# Patient Record
Sex: FEMALE | Race: WHITE | URBAN - METROPOLITAN AREA
[De-identification: names, ages, dates, MRNs, and addresses within clinical notes are randomized per-mention and may not be internally consistent; named-entity substitution may affect disease eponyms.]

---

## 2022-03-17 LAB
ANTIBODY SCREEN, EXTERNAL: NEGATIVE
CHLAMYDIA, EXTERNAL: NEGATIVE
HBSAG, EXTERNAL: NEGATIVE
HIV, EXTERNAL: NEGATIVE
N. GONORRHEA, EXTERNAL: NEGATIVE
RPR, EXTERNAL: NON REACTIVE
RUBELLA, EXTERNAL: NORMAL
TYPE, ABO & RH, EXTERNAL: NORMAL

## 2022-08-12 ENCOUNTER — HOSPITAL ENCOUNTER (INPATIENT)
Age: 32
LOS: 4 days | Discharge: HOME | DRG: 833 | End: 2022-08-16
Attending: OBSTETRICS & GYNECOLOGY | Admitting: OBSTETRICS & GYNECOLOGY

## 2022-08-12 PROBLEM — O44.00 PLACENTA PREVIA: Status: ACTIVE | Noted: 2022-08-12

## 2022-08-12 LAB
ABO + RH BLD: NORMAL
BLOOD GROUP ANTIBODIES SERPL: NORMAL
ERYTHROCYTE [DISTWIDTH] IN BLOOD BY AUTOMATED COUNT: 12.7 % (ref 11.5–14.5)
HCT VFR BLD AUTO: 34.5 % (ref 35–47)
HGB BLD-MCNC: 11.7 G/DL (ref 11.5–16)
MCH RBC QN AUTO: 31.5 PG (ref 26–34)
MCHC RBC AUTO-ENTMCNC: 33.9 G/DL (ref 30–36.5)
MCV RBC AUTO: 93 FL (ref 80–99)
NRBC # BLD: 0 K/UL (ref 0–0.01)
NRBC BLD-RTO: 0 PER 100 WBC
PLATELET # BLD AUTO: 324 K/UL (ref 150–400)
PMV BLD AUTO: 9.4 FL (ref 8.9–12.9)
RBC # BLD AUTO: 3.71 M/UL (ref 3.8–5.2)
SPECIMEN EXP DATE BLD: NORMAL
WBC # BLD AUTO: 9.6 K/UL (ref 3.6–11)

## 2022-08-12 PROCEDURE — 85027 COMPLETE CBC AUTOMATED: CPT

## 2022-08-12 PROCEDURE — 65410000002 HC RM PRIVATE OB

## 2022-08-12 PROCEDURE — 74011250637 HC RX REV CODE- 250/637: Performed by: OBSTETRICS & GYNECOLOGY

## 2022-08-12 PROCEDURE — 36415 COLL VENOUS BLD VENIPUNCTURE: CPT

## 2022-08-12 PROCEDURE — 86900 BLOOD TYPING SEROLOGIC ABO: CPT

## 2022-08-12 PROCEDURE — 59025 FETAL NON-STRESS TEST: CPT

## 2022-08-12 PROCEDURE — 99283 EMERGENCY DEPT VISIT LOW MDM: CPT

## 2022-08-12 PROCEDURE — 74011250636 HC RX REV CODE- 250/636: Performed by: OBSTETRICS & GYNECOLOGY

## 2022-08-12 PROCEDURE — 74011000250 HC RX REV CODE- 250: Performed by: OBSTETRICS & GYNECOLOGY

## 2022-08-12 RX ORDER — FAMOTIDINE 20 MG/1
20 TABLET, FILM COATED ORAL
Status: DISCONTINUED | OUTPATIENT
Start: 2022-08-12 | End: 2022-08-16 | Stop reason: HOSPADM

## 2022-08-12 RX ORDER — FOLIC ACID/MULTIVIT,IRON,MINER 0.4MG-18MG
1 TABLET ORAL DAILY
Status: DISCONTINUED | OUTPATIENT
Start: 2022-08-12 | End: 2022-08-16 | Stop reason: HOSPADM

## 2022-08-12 RX ORDER — ONDANSETRON 4 MG/1
4 TABLET, ORALLY DISINTEGRATING ORAL
Status: DISCONTINUED | OUTPATIENT
Start: 2022-08-12 | End: 2022-08-16 | Stop reason: HOSPADM

## 2022-08-12 RX ORDER — BETAMETHASONE SODIUM PHOSPHATE AND BETAMETHASONE ACETATE 3; 3 MG/ML; MG/ML
12 INJECTION, SUSPENSION INTRA-ARTICULAR; INTRALESIONAL; INTRAMUSCULAR; SOFT TISSUE EVERY 24 HOURS
Status: COMPLETED | OUTPATIENT
Start: 2022-08-12 | End: 2022-08-13

## 2022-08-12 RX ORDER — DIPHENHYDRAMINE HCL 25 MG
25 CAPSULE ORAL
Status: DISCONTINUED | OUTPATIENT
Start: 2022-08-12 | End: 2022-08-16 | Stop reason: HOSPADM

## 2022-08-12 RX ORDER — DOCUSATE SODIUM 100 MG/1
100 CAPSULE, LIQUID FILLED ORAL
Status: DISCONTINUED | OUTPATIENT
Start: 2022-08-12 | End: 2022-08-16 | Stop reason: HOSPADM

## 2022-08-12 RX ORDER — SODIUM CHLORIDE 0.9 % (FLUSH) 0.9 %
5-40 SYRINGE (ML) INJECTION AS NEEDED
Status: DISCONTINUED | OUTPATIENT
Start: 2022-08-12 | End: 2022-08-16 | Stop reason: HOSPADM

## 2022-08-12 RX ORDER — SERTRALINE HYDROCHLORIDE 50 MG/1
50 TABLET, FILM COATED ORAL DAILY
Status: DISCONTINUED | OUTPATIENT
Start: 2022-08-12 | End: 2022-08-16 | Stop reason: HOSPADM

## 2022-08-12 RX ORDER — SODIUM CHLORIDE 0.9 % (FLUSH) 0.9 %
5-40 SYRINGE (ML) INJECTION EVERY 8 HOURS
Status: DISCONTINUED | OUTPATIENT
Start: 2022-08-12 | End: 2022-08-16 | Stop reason: HOSPADM

## 2022-08-12 RX ORDER — MAG HYDROX/ALUMINUM HYD/SIMETH 200-200-20
30 SUSPENSION, ORAL (FINAL DOSE FORM) ORAL
Status: DISCONTINUED | OUTPATIENT
Start: 2022-08-12 | End: 2022-08-16 | Stop reason: HOSPADM

## 2022-08-12 RX ADMIN — SERTRALINE 50 MG: 50 TABLET, FILM COATED ORAL at 11:00

## 2022-08-12 RX ADMIN — SODIUM CHLORIDE, PRESERVATIVE FREE 10 ML: 5 INJECTION INTRAVENOUS at 14:00

## 2022-08-12 RX ADMIN — BETAMETHASONE SODIUM PHOSPHATE AND BETAMETHASONE ACETATE 12 MG: 3; 3 INJECTION, SUSPENSION INTRA-ARTICULAR; INTRALESIONAL; INTRAMUSCULAR; SOFT TISSUE at 09:52

## 2022-08-12 RX ADMIN — Medication 1 TABLET: at 11:00

## 2022-08-12 NOTE — H&P
History & Physical    Name: Carl Aviles MRN: [id number]  SSN: [id number]    YOB: 1990  Age: 32 y.o. Sex: female      Subjective:     Reason for Admission:  Pregnancy and Placenta Previa    History of Present Illness: Ms. Beth Recinos is a 32 y.o.   female, pt of [place], with an estimated gestational age of 32w2d with Estimated Date of Delivery: 10/24/22 who is being admitted from [facility] with first bleed from placenta previa. OB History    Para Term  AB Living   1             SAB IAB Ectopic Molar Multiple Live Births                    # Outcome Date GA Lbr Feliciano/2nd Weight Sex Delivery Anes PTL Lv   1 Current              No past medical history on file. No past surgical history on file. Social History     Occupational History    Not on file   Tobacco Use    Smoking status: Not on file    Smokeless tobacco: Not on file   Substance and Sexual Activity    Alcohol use: Not on file    Drug use: Not on file    Sexual activity: Not on file      No family history on file. Allergies   Allergen Reactions    Sulfa (Sulfonamide Antibiotics) Rash     Prior to Admission medications    Not on File        Review of Systems:  A comprehensive review of systems was negative except for that written in the History of Present Illness. Objective:     Vitals:    Vitals:    22 0826 22 0834   BP: 136/87    Pulse: 77    Resp: 15    Temp: 98.3 °F (36.8 °C)    SpO2: 98%    Weight:  79.8 kg (176 lb)   Height:  5' 11\" (1.803 m)      Temp (24hrs), Av.3 °F (36.8 °C), Min:98.3 °F (36.8 °C), Max:98.3 °F (36.8 °C)    BP  Min: 136/87  Max: 136/87     Physical Exam:  Patient without distress.   Heart: Regular rate and rhythm  Lung: normal respiratory effort  Abdomen: soft, nontender  Perineum: blood present,     Membranes:  Intact  Uterine Activity:  None  Fetal Heart Rate:  Reactive  Baseline: 145 per minute  Variability: moderate  Accelerations: yes  Decelerations: none       On sterile spec exam there was about 2 tablespoons of blood in vault, no active bleeding    Lab/Data Review:  No results found for this or any previous visit (from the past 24 hour(s)). Assessment and Plan:      Active Problems:    Placenta previa (2022)       33 yo  @ 29w4d with first bleed from placenta previa  Currently stable without active bleeding and with reassuring fetal status  Admit  BMTZ  Send T&S/CBC  Observe inpatient until 24-48 hours of no bleeding  All questions answered

## 2022-08-12 NOTE — PROGRESS NOTES
0820 Pt presents to OBED1 with c/o vb around 0800. Pt states there was a small to moderate amount of red blood in her underwear and a little more when she wiped. No bleeding since. No c/o pain or LOF. Known previa diagnosis. +FM. 0968 Dr Birdia Riedel at bedside. Sterile speculum exam done. Small amount of bleeding noted. Pt to be adm to [facility] for obs. Steroids ordered.

## 2022-08-12 NOTE — PROGRESS NOTES
Problem: Patient Education: Go to Patient Education Activity  Goal: Patient/Family Education  Outcome: Progressing Towards Goal     Problem: Antepartum: Day 1 through Discharge  Goal: Activity/Safety  Outcome: Progressing Towards Goal  Goal: Consults, if ordered  Outcome: Progressing Towards Goal  Goal: Diagnostic Test/Procedures  Outcome: Progressing Towards Goal  Goal: Nutrition/Diet  Outcome: Progressing Towards Goal  Goal: Discharge Planning  Outcome: Progressing Towards Goal  Goal: Treatments/Interventions/Procedures  Outcome: Progressing Towards Goal  Goal: *Vital signs within defined limits  Outcome: Progressing Towards Goal  Goal: *Labs within defined limits  Outcome: Progressing Towards Goal  Goal: *Tolerating diet  Outcome: Progressing Towards Goal  Goal: *Reassuring fetal surveillance  Outcome: Progressing Towards Goal  Goal: *Free from active signs of labor  Outcome: Progressing Towards Goal     Problem: Falls - Risk of  Goal: *Absence of Falls  Description: Document Stacy Fall Risk and appropriate interventions in the flowsheet.   Outcome: Progressing Towards Goal  Note: Fall Risk Interventions:                                Problem: Patient Education: Go to Patient Education Activity  Goal: Patient/Family Education  Outcome: Progressing Towards Goal     Problem: Pain  Goal: *Control of Pain  Outcome: Progressing Towards Goal     Problem: Patient Education: Go to Patient Education Activity  Goal: Patient/Family Education  Outcome: Progressing Towards Goal

## 2022-08-12 NOTE — PROGRESS NOTES
TRANSFER - IN REPORT:    Verbal report received from NOREEN Coppola(name) on [facility]  being received from L&D(unit) for routine progression of care      Report consisted of patients Situation, Background, Assessment and   Recommendations(SBAR). Information from the following report(s) SBAR, Kardex, MAR, and Recent Results was reviewed with the receiving nurse. Opportunity for questions and clarification was provided. Assessment completed upon patients arrival to unit and care assumed.

## 2022-08-12 NOTE — ED PROVIDER NOTES
31 yo  @ 29w4d, pt of Martha who presents with vaginal bleeding in the setting of known placenta previa. This morning around 8 AM she had blood that filled the toilet. Wiped and had some more and then none since coming in. A little bit of crampy pain in the car but nothing major and none now. Good fetal movement. Previa last scanned on  @ 28 weeks and still presents    Other pregnancy issues  Anxiety - zoloft 50 mg  A+/ ruebella non immune  E coli UTI with neg CATARINO    Vaginal Bleeding  This is a new problem. The current episode started 1 to 2 hours ago. Chief Complaint   Patient presents with    Vaginal Bleeding       No past medical history on file. No past surgical history on file. No family history on file. Social History     Socioeconomic History    Marital status: SINGLE     Spouse name: Not on file    Number of children: Not on file    Years of education: Not on file    Highest education level: Not on file   Occupational History    Not on file   Tobacco Use    Smoking status: Not on file    Smokeless tobacco: Not on file   Substance and Sexual Activity    Alcohol use: Not on file    Drug use: Not on file    Sexual activity: Not on file   Other Topics Concern    Not on file   Social History Narrative    Not on file     Social Determinants of Health     Financial Resource Strain: Not on file   Food Insecurity: Not on file   Transportation Needs: Not on file   Physical Activity: Not on file   Stress: Not on file   Social Connections: Not on file   Intimate Partner Violence: Not on file   Housing Stability: Not on file         ALLERGIES: Sulfa (sulfonamide antibiotics)    Review of Systems   Genitourinary:  Positive for vaginal bleeding. All other systems reviewed and are negative.     Vitals:    22 0826 22 0834   BP: 136/87    Pulse: 77    Resp: 15    Temp: 98.3 °F (36.8 °C)    SpO2: 98%    Weight:  79.8 kg (176 lb)   Height:  5' 11\" (1.803 m) Physical Exam  Constitutional:       General: She is not in acute distress. Appearance: Normal appearance. She is normal weight. She is not ill-appearing. Cardiovascular:      Rate and Rhythm: Normal rate and regular rhythm. Pulmonary:      Effort: Pulmonary effort is normal.   Abdominal:      Palpations: Abdomen is soft. Genitourinary:     Comments: On sterile spec exam there are about 2 tablespoons of blood in the vaginal vault, no active bleeding from os  Neurological:      Mental Status: She is alert.       Uterine Activity:  None  Fetal Heart Rate:  Reactive  Baseline: 145 per minute  Variability: moderate  Accelerations: yes  Decelerations: none       MDM     Amount and/or Complexity of Data Reviewed  Review and summarize past medical records: yes    Risk of Complications, Morbidity, and/or Mortality  Presenting problems: high  Diagnostic procedures: high  Management options: high        A/P    31 yo  @ 29w4d  Placenta previa - first bleed  Admit for steroids and to monitor bleeding         Procedures    [unfilled]

## 2022-08-13 PROCEDURE — 74011250636 HC RX REV CODE- 250/636: Performed by: OBSTETRICS & GYNECOLOGY

## 2022-08-13 PROCEDURE — 59025 FETAL NON-STRESS TEST: CPT

## 2022-08-13 PROCEDURE — 65410000002 HC RM PRIVATE OB

## 2022-08-13 PROCEDURE — 74011250637 HC RX REV CODE- 250/637: Performed by: OBSTETRICS & GYNECOLOGY

## 2022-08-13 RX ORDER — ACETAMINOPHEN 325 MG/1
650 TABLET ORAL
Status: DISCONTINUED | OUTPATIENT
Start: 2022-08-13 | End: 2022-08-16 | Stop reason: HOSPADM

## 2022-08-13 RX ADMIN — BETAMETHASONE SODIUM PHOSPHATE AND BETAMETHASONE ACETATE 12 MG: 3; 3 INJECTION, SUSPENSION INTRA-ARTICULAR; INTRALESIONAL; INTRAMUSCULAR; SOFT TISSUE at 10:21

## 2022-08-13 RX ADMIN — DIPHENHYDRAMINE HYDROCHLORIDE 25 MG: 25 CAPSULE ORAL at 22:05

## 2022-08-13 RX ADMIN — ACETAMINOPHEN 650 MG: 325 TABLET ORAL at 05:33

## 2022-08-13 NOTE — PROGRESS NOTES
Bedside and Verbal shift change report given to Angel (oncoming nurse) by Mathieu Ritchie (offgoing nurse). Report included the following information SBAR, Kardex, Intake/Output, MAR, and Recent Results. 3156: Patient called out for c/o heavier bleeding. RN assessed bleeding and notified MD on call. Will follow up in an hour to further assess patient. 0630: Reassessed patient's bleeding status. Bleeding has improved.

## 2022-08-13 NOTE — PROGRESS NOTES
History & Physical    Name: Ann-Marie Tang MRN: [id number]  SSN: [id number]    YOB: 1990  Age: 32 y.o. Sex: female      Subjective:     Reason for Admission:  Pregnancy and Placenta Previa    History of Present Illness: Ms. Carson Black is a 32 y.o.  female with an estimated gestational age of 34w10d with Estimated Date of Delivery: 10/24/22. Patient complains of mild vaginal bleeding for 2 days. Pregnancy has been complicated by vaginal bleeding. Patient denies abdominal pain  . OB History    Para Term  AB Living   1             SAB IAB Ectopic Molar Multiple Live Births                    # Outcome Date GA Lbr Feliciano/2nd Weight Sex Delivery Anes PTL Lv   1 Current              No past medical history on file. No past surgical history on file. Social History     Occupational History    Not on file   Tobacco Use    Smoking status: Not on file    Smokeless tobacco: Not on file   Substance and Sexual Activity    Alcohol use: Not on file    Drug use: Not on file    Sexual activity: Not on file      No family history on file. Allergies   Allergen Reactions    Sulfa (Sulfonamide Antibiotics) Rash     Prior to Admission medications    Not on File        Review of Systems:  A comprehensive review of systems was negative except for that written in the History of Present Illness. Objective:     Vitals:    Vitals:    22 1600 22 2100 22 0045 22 0918   BP: 115/71 120/77 111/70 103/68   Pulse: 93 83 93 83   Resp: 16 16 16 18   Temp: 98.5 °F (36.9 °C) 98.7 °F (37.1 °C) 98.5 °F (36.9 °C) 98.2 °F (36.8 °C)   SpO2: 97% 96% 96% 98%   Weight:       Height:          Temp (24hrs), Av.5 °F (36.9 °C), Min:98.2 °F (36.8 °C), Max:98.7 °F (37.1 °C)    BP  Min: 103/68  Max: 120/77     Physical Exam:  Patient without distress.   Heart: Regular rate and rhythm  Lung: clear to auscultation throughout lung fields, no wheezes, no rales, no rhonchi, and normal respiratory effort  Abdomen: soft, nontender  Fundus: soft and non tender  Lower Extremities:  - Edema 1+     Membranes:  Intact  Uterine Activity:  None  Fetal Heart Rate:  Reactive  Baseline: 145 per minute  Variability: moderate  Accelerations: yes       Lab/Data Review:  No results found for this or any previous visit (from the past 24 hour(s)). Assessment and Plan: Active Problems:    Placenta previa (8/12/2022)       Cont mod bed rest and observation, still spotting and at this time not a candidate for discharge to home.  If no vaginal bleed for 24-48hrs may be a candiate for d/c to home

## 2022-08-14 PROCEDURE — 74011250637 HC RX REV CODE- 250/637: Performed by: OBSTETRICS & GYNECOLOGY

## 2022-08-14 PROCEDURE — 59025 FETAL NON-STRESS TEST: CPT

## 2022-08-14 PROCEDURE — 65410000002 HC RM PRIVATE OB

## 2022-08-14 RX ADMIN — SERTRALINE 50 MG: 50 TABLET, FILM COATED ORAL at 09:17

## 2022-08-14 RX ADMIN — Medication 1 TABLET: at 09:17

## 2022-08-14 NOTE — PROGRESS NOTES
High Risk Obstetrics Progress Note    Name: Saturnino Stern MRN: [id number]  SSN: [id number]    YOB: 1990  Age: 32 y.o. Sex: female      Subjective:      LOS: 2 days    Estimated Date of Delivery: 10/24/22   Gestational Age Today: 29w6d     Patient admitted for placenta previa. States she does have  spotting vaginal bleeding and normal fetal movement and does not have abdominal pain  , chest pain, contractions, fever, headache , nausea and vomiting, pelvic pressure, right upper quadrant pain  , shortness of breath, swelling, vaginal leaking of fluid , and visual disturbances. Objective:     Vitals:  Blood pressure 109/63, pulse 92, temperature 98.1 °F (36.7 °C), resp. rate 15, height 5' 11\" (1.803 m), weight 79.8 kg (176 lb), SpO2 96 %. Temp (24hrs), Av.2 °F (36.8 °C), Min:98.1 °F (36.7 °C), Max:98.3 °F (89.5 °C)    Systolic (01RRZ), YZV:772 , Min:103 , SDO:116      Diastolic (57VSK), WOD:49, Min:63, Max:73       Intake and Output:         Physical Exam:  Patient without distress. Heart: Regular rate and rhythm  Lung: clear to auscultation throughout lung fields, no wheezes, no rales, no rhonchi, and normal respiratory effort  Abdomen: soft, nontender  Fundus: soft and non tender       Membranes:  Intact    Uterine Activity:  None    Fetal Heart Rate:  Reactive        Labs: No results found for this or any previous visit (from the past 36 hour(s)). Assessment and Plan: Active Problems:    Placenta previa (2022)       Bleeding continues to improve, no other symptoms and liberalization of activity. May be canidate for home management if cont to improve

## 2022-08-14 NOTE — PROGRESS NOTES
Bedside and Verbal shift change report given to 2825 Lissa Hernandez (oncoming nurse) by Jonah Levine (offgoing nurse). Report included the following information SBAR, Kardex, Intake/Output, MAR, and Recent Results.

## 2022-08-14 NOTE — PROGRESS NOTES
Bedside and Verbal shift change report given to 2825 Lissa Hernandez (oncoming nurse) by Ro Saavedra (offgoing nurse). Report included the following information SBAR, Kardex, Intake/Output, MAR, and Recent Results.

## 2022-08-15 PROCEDURE — 65410000002 HC RM PRIVATE OB

## 2022-08-15 PROCEDURE — 59025 FETAL NON-STRESS TEST: CPT

## 2022-08-15 PROCEDURE — 74011250637 HC RX REV CODE- 250/637: Performed by: OBSTETRICS & GYNECOLOGY

## 2022-08-15 RX ADMIN — Medication 1 TABLET: at 08:08

## 2022-08-15 RX ADMIN — SERTRALINE 50 MG: 50 TABLET, FILM COATED ORAL at 08:08

## 2022-08-15 NOTE — PROGRESS NOTES
High Risk Obstetrics Progress Note    Name: Harish Chatman MRN: [id number]  SSN: [id number]    YOB: 1990  Age: 32 y.o. Sex: female      Subjective:      LOS: 3 days    Estimated Date of Delivery: 10/24/22   Gestational Age Today: 26w0d     Patient admitted for  first vaginal bleed with known previa . States she does have normal fetal movement and brown spotting with wiping but no bright red since Saturday morning  and does not have abdominal pain  , contractions, and vaginal leaking of fluid . Objective:     Vitals:  Blood pressure 107/67, pulse 85, temperature 98.1 °F (36.7 °C), resp. rate 18, height 5' 11\" (1.803 m), weight 79.8 kg (176 lb), SpO2 97 %. Temp (24hrs), Av °F (36.7 °C), Min:97.8 °F (36.6 °C), Max:98.1 °F (32.9 °C)    Systolic (72XDZ), MXM:582 , Min:105 , IWS:042      Diastolic (69ZGE), SMP:93, Min:66, Max:75       Intake and Output:         Physical Exam:  Patient without distress. Heart: Regular rate and rhythm  Lung: clear to auscultation throughout lung fields, no wheezes, no rales, no rhonchi, and normal respiratory effort  Abdomen: soft, nontender, gravid  Lower Extremities:  - Edema No       Membranes:  Intact    NST pending for today      Labs: No results found for this or any previous visit (from the past 36 hour(s)). Assessment and Plan: Active Problems:    Placenta previa (2022)       31 y/o G1 at 30 0/7 weeks admitted for first bleed with known previa. -no red bleeding noted for 2 days. Brown spotting continues to lighten. A positive-keep type and screen active   -no evidence of contractions/PTL  Daily NST  Continue to increase activity today (may walk in halls)  Consider discharge home if/when no bleeding for 24-48 hours.

## 2022-08-15 NOTE — PROGRESS NOTES
Bedside and Verbal shift change report given to ROSELINE Trivedi (oncoming nurse) by Hoa Collier RN (offgoing nurse). Report included the following information SBAR, Kardex, ED Summary, Procedure Summary, Intake/Output, MAR, Accordion, and Recent Results.

## 2022-08-16 VITALS
SYSTOLIC BLOOD PRESSURE: 112 MMHG | TEMPERATURE: 97.6 F | RESPIRATION RATE: 16 BRPM | OXYGEN SATURATION: 97 % | BODY MASS INDEX: 24.64 KG/M2 | DIASTOLIC BLOOD PRESSURE: 72 MMHG | HEIGHT: 71 IN | WEIGHT: 176 LBS | HEART RATE: 98 BPM

## 2022-08-16 PROCEDURE — 74011250637 HC RX REV CODE- 250/637: Performed by: OBSTETRICS & GYNECOLOGY

## 2022-08-16 PROCEDURE — 59025 FETAL NON-STRESS TEST: CPT

## 2022-08-16 RX ADMIN — SERTRALINE 50 MG: 50 TABLET, FILM COATED ORAL at 10:26

## 2022-08-16 RX ADMIN — Medication 1 TABLET: at 10:26

## 2022-08-16 NOTE — DISCHARGE SUMMARY
Obstetrical Discharge Summary     Name: Chaparro Prescott MRN: [id number]  SSN: [id number]    YOB: 1990  Age: 32 y.o. Sex: female      Allergies: Sulfa (sulfonamide antibiotics)    Admit Date: 8/12/2022    Discharge Date: 8/16/2022     Admitting Physician: Phyllis Smith MD     Attending Physician:  Magen Costello MD     * Admission Diagnoses: Placenta previa [O44.00], bleeding    * Discharge Diagnoses: This patient has no babies on file. Additional Diagnoses:   Hospital Problems as of 8/16/2022 Never Reviewed            Codes Class Noted - Resolved POA    Placenta previa ICD-10-CM: O44.00  ICD-9-CM: 641.10  8/12/2022 - Present Unknown          Lab Results   Component Value Date/Time    ABO/Rh(D) A POSITIVE 08/12/2022 10:00 AM      There is no immunization history on file for this patient. * Procedures: none  * No surgery found *           * Discharge Condition: good    [facility] Course: Admitted with bleeding due to placenta previa. Received betamethasone. Bleeding decreased over several days and she had not further red bleeding (only brown discharge) despite gradually increasing her activity. * Disposition: Home    Discharge Medications: There are no discharge medications for this patient. * Follow-up Care/Patient Instructions:   Activity: Ambulate in house and nothing in the vagina  Diet: Regular Diet  Wound Care: None needed    Follow-up Information    None        F/U as scheduled 8/18 with Dr. Rajeev Mason

## 2022-08-16 NOTE — DISCHARGE INSTRUCTIONS
Placenta Previa: Care Instructions  Your Care Instructions     The placenta forms during pregnancy. It gives the baby nutrients and oxygen. It also removes waste products. Normally, the placenta attaches to the inner wall of the uterus, away from the opening of the uterus. Sometimes the placenta attaches so low that it blocks part of the opening. This is called placenta previa. Bed rest has not been shown to help prevent problems in most women with placenta previa. But your doctor may recommend that you limit your activities. Your doctor will watch you closely until your baby can be safely delivered. This can be a scary time. Most of the time a  delivery is done. Follow-up care is a key part of your treatment and safety. Be sure to make and go to all appointments, and call your doctor if you are having problems. It's also a good idea to know your test results and keep a list of the medicines you take. How can you care for yourself at home? Do not do any heavy activity. Do not run or lift anything that weighs more than 20 pounds. Have a phone nearby at all times. If you start to bleed, you will need to call your doctor right away. Tell all doctors and nurses who examine you that you must not have pelvic exams because you have placenta previa. Ask your doctor if you can have sex. Many doctors recommend that women with placenta previa not have intercourse after 28 weeks of pregnancy. Do not put anything, such as tampons or douches, into your vagina. Use pads if you are bleeding, and call your doctor. When should you call for help? Call 911 anytime you think you may need emergency care. For example, call if:    You passed out (lost consciousness). You have severe vaginal bleeding. Call your doctor now or seek immediate medical care if:    You have any vaginal bleeding. You have pain in your belly or pelvis. You think that you are in labor.      You have a sudden release of fluid from your vagina. You notice that your baby has stopped moving or is moving much less than normal.   Watch closely for changes in your health, and be sure to contact your doctor if you have any questions or concerns. Where can you learn more? Go to http://www.gray.com/  Enter A070 in the search box to learn more about \"Placenta Previa: Care Instructions. \"  Current as of: June 16, 2021               Content Version: 13.2  © 2006-2022 [employer], [employer]. Care instructions adapted under license by [employer] (which disclaims liability or warranty for this information). If you have questions about a medical condition or this instruction, always ask your healthcare professional. [address] any warranty or liability for your use of this information.

## 2022-08-16 NOTE — PROGRESS NOTES
Bedside and Verbal shift change report given to BALWINDER Fulton RN (oncoming nurse) by Kitty Perez RN (offgoing nurse). Report included the following information SBAR, Kardex, and ED Summary.

## 2022-08-16 NOTE — PROGRESS NOTES
Bedside and Verbal shift change report given to BALWINDER López RN  (oncoming nurse) by Marily Ennis RN  (offgoing nurse). Report included the following information SBAR, Kardex, MAR, and Recent Results. 1115 - I have reviewed discharge instructions with the patient and spouse. The patient and spouse verbalized understanding.

## 2022-09-05 ENCOUNTER — HOSPITAL ENCOUNTER (INPATIENT)
Age: 32
LOS: 10 days | Discharge: HOME | End: 2022-09-15
Attending: OBSTETRICS & GYNECOLOGY | Admitting: OBSTETRICS & GYNECOLOGY
Payer: COMMERCIAL

## 2022-09-05 PROBLEM — O44.00 PLACENTA PREVIA ANTEPARTUM: Status: ACTIVE | Noted: 2022-09-05

## 2022-09-05 PROBLEM — O44.03 PLACENTA PREVIA ANTEPARTUM IN THIRD TRIMESTER: Status: ACTIVE | Noted: 2022-09-05

## 2022-09-05 LAB
BASOPHILS # BLD: 0 K/UL (ref 0–0.1)
BASOPHILS NFR BLD: 0 % (ref 0–1)
DIFFERENTIAL METHOD BLD: ABNORMAL
EOSINOPHIL # BLD: 0.1 K/UL (ref 0–0.4)
EOSINOPHIL NFR BLD: 1 % (ref 0–7)
ERYTHROCYTE [DISTWIDTH] IN BLOOD BY AUTOMATED COUNT: 12.7 % (ref 11.5–14.5)
HCT VFR BLD AUTO: 35.1 % (ref 35–47)
HGB BLD-MCNC: 12 G/DL (ref 11.5–16)
IMM GRANULOCYTES # BLD AUTO: 0.1 K/UL (ref 0–0.04)
IMM GRANULOCYTES NFR BLD AUTO: 1 % (ref 0–0.5)
LYMPHOCYTES # BLD: 2.6 K/UL (ref 0.8–3.5)
LYMPHOCYTES NFR BLD: 26 % (ref 12–49)
MCH RBC QN AUTO: 31.4 PG (ref 26–34)
MCHC RBC AUTO-ENTMCNC: 34.2 G/DL (ref 30–36.5)
MCV RBC AUTO: 91.9 FL (ref 80–99)
MONOCYTES # BLD: 0.7 K/UL (ref 0–1)
MONOCYTES NFR BLD: 7 % (ref 5–13)
NEUTS SEG # BLD: 6.5 K/UL (ref 1.8–8)
NEUTS SEG NFR BLD: 65 % (ref 32–75)
NRBC # BLD: 0 K/UL (ref 0–0.01)
NRBC BLD-RTO: 0 PER 100 WBC
PLATELET # BLD AUTO: 338 K/UL (ref 150–400)
PMV BLD AUTO: 9.9 FL (ref 8.9–12.9)
RBC # BLD AUTO: 3.82 M/UL (ref 3.8–5.2)
WBC # BLD AUTO: 10.1 K/UL (ref 3.6–11)

## 2022-09-05 PROCEDURE — 85025 COMPLETE CBC W/AUTO DIFF WBC: CPT

## 2022-09-05 PROCEDURE — 65270000029 HC RM PRIVATE

## 2022-09-05 PROCEDURE — 36415 COLL VENOUS BLD VENIPUNCTURE: CPT

## 2022-09-05 PROCEDURE — 74011250637 HC RX REV CODE- 250/637: Performed by: OBSTETRICS & GYNECOLOGY

## 2022-09-05 PROCEDURE — 99283 EMERGENCY DEPT VISIT LOW MDM: CPT

## 2022-09-05 PROCEDURE — 74011250636 HC RX REV CODE- 250/636: Performed by: OBSTETRICS & GYNECOLOGY

## 2022-09-05 PROCEDURE — 86900 BLOOD TYPING SEROLOGIC ABO: CPT

## 2022-09-05 RX ORDER — SERTRALINE HYDROCHLORIDE 25 MG/1
50 TABLET, FILM COATED ORAL DAILY
COMMUNITY

## 2022-09-05 RX ORDER — DOCUSATE SODIUM 100 MG/1
100 CAPSULE, LIQUID FILLED ORAL
Status: DISCONTINUED | OUTPATIENT
Start: 2022-09-05 | End: 2022-09-06

## 2022-09-05 RX ORDER — DIPHENHYDRAMINE HYDROCHLORIDE 50 MG/ML
12.5 INJECTION, SOLUTION INTRAMUSCULAR; INTRAVENOUS
Status: DISCONTINUED | OUTPATIENT
Start: 2022-09-05 | End: 2022-09-15 | Stop reason: HOSPADM

## 2022-09-05 RX ORDER — SODIUM CHLORIDE, SODIUM LACTATE, POTASSIUM CHLORIDE, CALCIUM CHLORIDE 600; 310; 30; 20 MG/100ML; MG/100ML; MG/100ML; MG/100ML
125 INJECTION, SOLUTION INTRAVENOUS CONTINUOUS
Status: DISCONTINUED | OUTPATIENT
Start: 2022-09-05 | End: 2022-09-06

## 2022-09-05 RX ORDER — FOLIC ACID/MULTIVIT,IRON,MINER 0.4MG-18MG
1 TABLET ORAL DAILY
Status: DISCONTINUED | OUTPATIENT
Start: 2022-09-06 | End: 2022-09-15 | Stop reason: HOSPADM

## 2022-09-05 RX ORDER — SODIUM CHLORIDE 0.9 % (FLUSH) 0.9 %
5-40 SYRINGE (ML) INJECTION EVERY 8 HOURS
Status: DISCONTINUED | OUTPATIENT
Start: 2022-09-05 | End: 2022-09-15 | Stop reason: HOSPADM

## 2022-09-05 RX ORDER — ACETAMINOPHEN 325 MG/1
650 TABLET ORAL
Status: DISCONTINUED | OUTPATIENT
Start: 2022-09-05 | End: 2022-09-15 | Stop reason: HOSPADM

## 2022-09-05 RX ORDER — SERTRALINE HYDROCHLORIDE 50 MG/1
50 TABLET, FILM COATED ORAL DAILY
Status: DISCONTINUED | OUTPATIENT
Start: 2022-09-05 | End: 2022-09-15 | Stop reason: HOSPADM

## 2022-09-05 RX ORDER — SODIUM CHLORIDE, SODIUM LACTATE, POTASSIUM CHLORIDE, CALCIUM CHLORIDE 600; 310; 30; 20 MG/100ML; MG/100ML; MG/100ML; MG/100ML
1000 INJECTION, SOLUTION INTRAVENOUS CONTINUOUS
Status: DISCONTINUED | OUTPATIENT
Start: 2022-09-05 | End: 2022-09-06

## 2022-09-05 RX ORDER — ONDANSETRON 2 MG/ML
4 INJECTION INTRAMUSCULAR; INTRAVENOUS
Status: DISCONTINUED | OUTPATIENT
Start: 2022-09-05 | End: 2022-09-15 | Stop reason: HOSPADM

## 2022-09-05 RX ORDER — BETAMETHASONE SODIUM PHOSPHATE AND BETAMETHASONE ACETATE 3; 3 MG/ML; MG/ML
12 INJECTION, SUSPENSION INTRA-ARTICULAR; INTRALESIONAL; INTRAMUSCULAR; SOFT TISSUE ONCE
Status: COMPLETED | OUTPATIENT
Start: 2022-09-05 | End: 2022-09-05

## 2022-09-05 RX ORDER — SODIUM CHLORIDE 0.9 % (FLUSH) 0.9 %
5-40 SYRINGE (ML) INJECTION AS NEEDED
Status: DISCONTINUED | OUTPATIENT
Start: 2022-09-05 | End: 2022-09-15 | Stop reason: HOSPADM

## 2022-09-05 RX ORDER — BETAMETHASONE SODIUM PHOSPHATE AND BETAMETHASONE ACETATE 3; 3 MG/ML; MG/ML
12 INJECTION, SUSPENSION INTRA-ARTICULAR; INTRALESIONAL; INTRAMUSCULAR; SOFT TISSUE EVERY 24 HOURS
Status: DISCONTINUED | OUTPATIENT
Start: 2022-09-05 | End: 2022-09-05

## 2022-09-05 RX ADMIN — BETAMETHASONE SODIUM PHOSPHATE AND BETAMETHASONE ACETATE 12 MG: 3; 3 INJECTION, SUSPENSION INTRA-ARTICULAR; INTRALESIONAL; INTRAMUSCULAR at 06:52

## 2022-09-05 RX ADMIN — ACETAMINOPHEN 650 MG: 325 TABLET ORAL at 21:30

## 2022-09-05 RX ADMIN — SERTRALINE 50 MG: 50 TABLET, FILM COATED ORAL at 10:38

## 2022-09-05 RX ADMIN — DOCUSATE SODIUM 100 MG: 100 CAPSULE, LIQUID FILLED ORAL at 14:19

## 2022-09-05 RX ADMIN — ACETAMINOPHEN 650 MG: 325 TABLET ORAL at 17:23

## 2022-09-05 NOTE — PROGRESS NOTES
High Risk Obstetrics Progress Note    Name: Estephanie Louis MRN: [id number]  SSN: [id number]    YOB: 1990  Age: 32 y.o. Sex: female      Subjective:      LOS: 0 days    Estimated Date of Delivery: 10/24/22   Gestational Age Today: 33w0d     Patient admitted for placenta previa and vaginal bleeding. States she does not have abdominal pain  , chest pain, fever, headache , nausea and vomiting, pelvic pressure, right upper quadrant pain  , shortness of breath, swelling, vaginal leaking of fluid , and visual disturbances. She is having mild contractions and upon going to Bathroom has had additional bleeding in toilet. She denies Lightheadedness or dizziness. This is her second admission for vaginal bleeding. First admission  after significant amount of bleeding in toilet at home , then additional bleeding in hospital. After an inpatient stay of several days, her bleeding tapered and she was sent home with no additional bleeding until today. She was seen in office for US a few days ago. She was found to initially have low lying placenta @ 20 weeks and then Placenta previa confirmed @ 28 weeks. Objective:     Vitals:  Blood pressure 110/70, pulse 77, temperature 98 °F (36.7 °C), resp. rate 16, height 5' 11\" (1.803 m), weight 82.1 kg (181 lb), not currently breastfeeding. Temp (24hrs), Av.2 °F (36.8 °C), Min:98 °F (36.7 °C), Max:98.3 °F (28.5 °C)    Systolic (43VOT), UDH:401 , Min:110 , ALDO:870      Diastolic (31IFC), EQL:59, Min:70, Max:75       Intake and Output:         Physical Exam:  Patient without distress.   Abdomen: soft, nontender  Fundus: soft and non tender       Membranes:  Intact    Uterine Activity:  Date/time of onset: 0600  Frequency: Every 5 minutes   Duration: 30 seconds  Intensity: mild    Fetal Heart Rate:  Baseline: 130 per minute  Variability: moderate  Accelerations: yes  Decelerations: none  Uterine contractions: regular, every 5 minutes        Labs:   Recent Results (from the past 36 hour(s))   CBC WITH AUTOMATED DIFF    Collection Time: 09/05/22  6:32 AM   Result Value Ref Range    WBC 10.1 3.6 - 11.0 K/uL    RBC 3.82 3.80 - 5.20 M/uL    HGB 12.0 11.5 - 16.0 g/dL    HCT 35.1 35.0 - 47.0 %    MCV 91.9 80.0 - 99.0 FL    MCH 31.4 26.0 - 34.0 PG    MCHC 34.2 30.0 - 36.5 g/dL    RDW 12.7 11.5 - 14.5 %    PLATELET 047 244 - 231 K/uL    MPV 9.9 8.9 - 12.9 FL    NRBC 0.0 0  WBC    ABSOLUTE NRBC 0.00 0.00 - 0.01 K/uL    NEUTROPHILS 65 32 - 75 %    LYMPHOCYTES 26 12 - 49 %    MONOCYTES 7 5 - 13 %    EOSINOPHILS 1 0 - 7 %    BASOPHILS 0 0 - 1 %    IMMATURE GRANULOCYTES 1 (H) 0.0 - 0.5 %    ABS. NEUTROPHILS 6.5 1.8 - 8.0 K/UL    ABS. LYMPHOCYTES 2.6 0.8 - 3.5 K/UL    ABS. MONOCYTES 0.7 0.0 - 1.0 K/UL    ABS. EOSINOPHILS 0.1 0.0 - 0.4 K/UL    ABS. BASOPHILS 0.0 0.0 - 0.1 K/UL    ABS. IMM. GRANS. 0.1 (H) 0.00 - 0.04 K/UL    DF AUTOMATED         Assessment and Plan: Active Problems:    Placenta previa antepartum in third trimester (9/5/2022)       Hemorrhage:  Continuous Fetal monitoring with FHR and [place] until further notice. Proceed with delivery if bleeding significantly worsens or if fetal condition becomes nonreassuring. Neonatology consult  Placenta Previa    Signed By: Abilio Sanchez MD     September 5, 2022                     Assessment and Plan:       Active Problems:    Placenta previa antepartum in third trimester (9/5/2022)           Signed By: Abilio Sanchez MD     September 5, 2022

## 2022-09-05 NOTE — PROGRESS NOTES
0740 Bedside and Verbal shift change report given to Az Barrientos RN (oncoming nurse) by Stalin Zacarias RN (offgoing nurse). Report included the following information SBAR, Procedure Summary, and Accordion. Duglas Vargas MD on call for VPFW on unit. Viewed EFM strip. Made aware of pt frequency of uterine contractions and passing of small clot with small amount of bleeding on tissue after BRP. Bolus of LR infusing. To give 500 ml per MD order    Anitra Pierson MD @ bedside to assess & discuss POC. MD viewed EFM strip. T&S ordered. Requested pt not eat between meal until bleeding can be assessed this AM.    1154 Seen by Kelton Cordero MD on call for VPFW. MD viewed EFM strip & assessed pt and placed antepartum orders. Received orders to advance diet if vaginal bleeding decreases. Call placed to NICU for consult per MD.    1220 Jaguar Lagos MD (NICU) at bedside for consult. 1400 Pt reports rare spotting on irma pad after void and is unaware of uterine contractions (currently 2/hr). Pt diet advanced. 1419 Colace 100 mg po as requested. 1723 Tylenol 650 mg po as requested for \"mild\" headache. 1935 Bedside and Verbal shift change report given to Natalia Marin RN (oncoming nurse) by Az Barrientos RN (offgoing nurse). Report included the following information SBAR, Kardex, MAR, Accordion, and Recent Results.

## 2022-09-05 NOTE — PROGRESS NOTES
AntePartum Progress Note    Olivia Jennings  33w0d    Patient admitted for vaginal bleeding 33w0d Estimated Date of Delivery: 10/24/22 states she does have mild vaginal bleeding. Vitals:  Patient Vitals for the past 24 hrs:   BP Temp Pulse Resp Height Weight   22 0810 110/70 98 °F (36.7 °C) 77 16 -- --   22 0510 124/75 98.3 °F (36.8 °C) 98 15 5' 11\" (1.803 m) 82.1 kg (181 lb)     Temp (24hrs), Av.2 °F (36.8 °C), Min:98 °F (36.7 °C), Max:98.3 °F (36.8 °C)    I&O:   No intake/output data recorded. No intake/output data recorded. NST:  Reactive  Uterine Activity: None    Exam:  Patient without distress. Abdomen soft, non-tender               Fundus soft and non tender               Lower extremities edema No                                           Labs:   Recent Results (from the past 24 hour(s))   CBC WITH AUTOMATED DIFF    Collection Time: 22  6:32 AM   Result Value Ref Range    WBC 10.1 3.6 - 11.0 K/uL    RBC 3.82 3.80 - 5.20 M/uL    HGB 12.0 11.5 - 16.0 g/dL    HCT 35.1 35.0 - 47.0 %    MCV 91.9 80.0 - 99.0 FL    MCH 31.4 26.0 - 34.0 PG    MCHC 34.2 30.0 - 36.5 g/dL    RDW 12.7 11.5 - 14.5 %    PLATELET 856 336 - 095 K/uL    MPV 9.9 8.9 - 12.9 FL    NRBC 0.0 0  WBC    ABSOLUTE NRBC 0.00 0.00 - 0.01 K/uL    NEUTROPHILS 65 32 - 75 %    LYMPHOCYTES 26 12 - 49 %    MONOCYTES 7 5 - 13 %    EOSINOPHILS 1 0 - 7 %    BASOPHILS 0 0 - 1 %    IMMATURE GRANULOCYTES 1 (H) 0.0 - 0.5 %    ABS. NEUTROPHILS 6.5 1.8 - 8.0 K/UL    ABS. LYMPHOCYTES 2.6 0.8 - 3.5 K/UL    ABS. MONOCYTES 0.7 0.0 - 1.0 K/UL    ABS. EOSINOPHILS 0.1 0.0 - 0.4 K/UL    ABS. BASOPHILS 0.0 0.0 - 0.1 K/UL    ABS. IMM.  GRANS. 0.1 (H) 0.00 - 0.04 K/UL    DF AUTOMATED     TYPE & SCREEN    Collection Time: 22  6:43 AM   Result Value Ref Range    Crossmatch Expiration 2022,2359     ABO/Rh(D) A POSITIVE     Antibody screen NEG        Assessment and Plan:   IUP @ 33w0d   Patient Active Problem List    Diagnosis Date Noted    Placenta previa antepartum in third trimester 09/05/2022    Placenta previa antepartum 09/05/2022    Placenta previa 08/12/2022     Known placenta previa- s/p 2nd bleed  - Received booster dose of steroids this morning- had been previously BMTZ complete from inpatient stay 2 wks ago  - NICU consult today  - If no further bleeding, OK to advance diet to regular  - Keep large bore IV in place. - Active T&S q3 days  - Possible MFM consult tomorrow  - Continuous monitoring for now.  After 24 hr of no further bleeding, consider moving to antepartum unit

## 2022-09-05 NOTE — ED PROVIDER NOTES
31 y/o G1 @ 33 weeks with Known H/O Placenta previa presented with vaginal bleeding. States the first episode - [place] bleed happened on 8/12. Has had some dark spotting since then. S/P BMZ x 2 doses on 8/12  States was turning on to her side when she felt a gush and has filled up a panty liner since then. AFM  No cramping    Other pregnancy issues  Anxiety - zoloft 50 mg  A+/ ruebella non immune  E coli UTI with neg CATARINO  EFW 9/1 91st percentile      Vaginal Bleeding         Chief Complaint   Patient presents with    Vaginal Bleeding       No past medical history on file. No past surgical history on file. No family history on file. Social History     Socioeconomic History    Marital status:      Spouse name: Not on file    Number of children: Not on file    Years of education: Not on file    Highest education level: Not on file   Occupational History    Not on file   Tobacco Use    Smoking status: Not on file    Smokeless tobacco: Not on file   Substance and Sexual Activity    Alcohol use: Not on file    Drug use: Not on file    Sexual activity: Not on file   Other Topics Concern    Not on file   Social History Narrative    Not on file     Social Determinants of Health     Financial Resource Strain: Not on file   Food Insecurity: Not on file   Transportation Needs: Not on file   Physical Activity: Not on file   Stress: Not on file   Social Connections: Not on file   Intimate Partner Violence: Not on file   Housing Stability: Not on file         ALLERGIES: Sulfa (sulfonamide antibiotics)    Review of Systems   Constitutional: Negative. HENT: Negative. Eyes: Negative. Respiratory: Negative. Cardiovascular: Negative. Gastrointestinal: Negative. Endocrine: Negative. Genitourinary:  Positive for vaginal bleeding. Musculoskeletal: Negative. Allergic/Immunologic: Negative. Neurological: Negative. Hematological: Negative.     Psychiatric/Behavioral: Negative. All other systems reviewed and are negative. Vitals:    09/05/22 0510   BP: 124/75   Pulse: 98   Resp: 15   Temp: 98.3 °F (36.8 °C)   Weight: 82.1 kg (181 lb)   Height: 5' 11\" (1.803 m)            Physical Exam  Vitals and nursing note reviewed. Exam conducted with a chaperone present. Constitutional:       Appearance: Normal appearance. She is normal weight. HENT:      Head: Normocephalic and atraumatic. Right Ear: Tympanic membrane normal.      Left Ear: Tympanic membrane normal.      Nose: Nose normal.      Mouth/Throat:      Pharynx: Oropharynx is clear. Eyes:      Extraocular Movements: Extraocular movements intact. Pupils: Pupils are equal, round, and reactive to light. Cardiovascular:      Rate and Rhythm: Normal rate and regular rhythm. Pulses: Normal pulses. Heart sounds: Normal heart sounds. Pulmonary:      Effort: Pulmonary effort is normal.      Breath sounds: Normal breath sounds. Abdominal:      Comments: Gravid, relaxed   Genitourinary:     Comments: SSE- moderate dark brownish blood around the os  Musculoskeletal:         General: Normal range of motion. Cervical back: Normal range of motion and neck supple. Neurological:      General: No focal deficit present. Mental Status: She is alert and oriented to person, place, and time.    Psychiatric:         Mood and Affect: Mood normal.         Behavior: Behavior normal.        MDM  Number of Diagnoses or Management Options  Diagnosis management comments: Vaginal bleeding in the third trimester  Posterior Placenta Previa    , +accels, moderate variability, no decels  [place] mild irritability    A/P  IUP @ 33 weeks with known complete Previa with episode of vaginal bleeding - Second bleed  Admit to L &D  CEFM and [place]  IVF  NPO  Type and screen  Rescue dose of steroids  NICU consult  Discussed the indications for delivery- hemorrhage, nonreassuring tracing    Chuck Stone MD

## 2022-09-05 NOTE — H&P
31 y/o G1 @ 33 weeks with Known H/O Placenta previa presented with vaginal bleeding. States the first episode - [place] bleed happened on 8/12. Has had some dark spotting since then. S/P BMZ x 2 doses on 8/12  States was turning on to her side when she felt a gush and has filled up a panty liner since then. AFM  No cramping     Other pregnancy issues  Anxiety - zoloft 50 mg  A+/ ruebella non immune  E coli UTI with neg CATARINO  EFW 9/1 91st percentile        Vaginal Bleeding             Chief Complaint   Patient presents with    Vaginal Bleeding         Past Medical History   No past medical history on file. Past Surgical History   No past surgical history on file. No family history on file. Social History            Socioeconomic History    Marital status:        Spouse name: Not on file    Number of children: Not on file    Years of education: Not on file    Highest education level: Not on file   Occupational History    Not on file   Tobacco Use    Smoking status: Not on file    Smokeless tobacco: Not on file   Substance and Sexual Activity    Alcohol use: Not on file    Drug use: Not on file    Sexual activity: Not on file   Other Topics Concern    Not on file   Social History Narrative    Not on file      Social Determinants of Health      Financial Resource Strain: Not on file   Food Insecurity: Not on file   Transportation Needs: Not on file   Physical Activity: Not on file   Stress: Not on file   Social Connections: Not on file   Intimate Partner Violence: Not on file   Housing Stability: Not on file            ALLERGIES: Sulfa (sulfonamide antibiotics)     Review of Systems   Constitutional: Negative. HENT: Negative. Eyes: Negative. Respiratory: Negative. Cardiovascular: Negative. Gastrointestinal: Negative. Endocrine: Negative. Genitourinary:  Positive for vaginal bleeding. Musculoskeletal: Negative. Allergic/Immunologic: Negative.     Neurological: Negative. Hematological: Negative. Psychiatric/Behavioral: Negative. All other systems reviewed and are negative. Vitals:     09/05/22 0510   BP: 124/75   Pulse: 98   Resp: 15   Temp: 98.3 °F (36.8 °C)   Weight: 82.1 kg (181 lb)   Height: 5' 11\" (1.803 m)             Physical Exam  Vitals and nursing note reviewed. Exam conducted with a chaperone present. Constitutional:       Appearance: Normal appearance. She is normal weight. HENT:      Head: Normocephalic and atraumatic. Right Ear: Tympanic membrane normal.      Left Ear: Tympanic membrane normal.      Nose: Nose normal.      Mouth/Throat:      Pharynx: Oropharynx is clear. Eyes:      Extraocular Movements: Extraocular movements intact. Pupils: Pupils are equal, round, and reactive to light. Cardiovascular:      Rate and Rhythm: Normal rate and regular rhythm. Pulses: Normal pulses. Heart sounds: Normal heart sounds. Pulmonary:      Effort: Pulmonary effort is normal.      Breath sounds: Normal breath sounds. Abdominal:      Comments: Gravid, relaxed   Genitourinary:     Comments: SSE- moderate dark brownish blood around the os  Musculoskeletal:         General: Normal range of motion. Cervical back: Normal range of motion and neck supple. Neurological:      General: No focal deficit present. Mental Status: She is alert and oriented to person, place, and time.    Psychiatric:         Mood and Affect: Mood normal.         Behavior: Behavior normal.         MDM  Number of Diagnoses or Management Options  Diagnosis management comments: Vaginal bleeding in the third trimester  Posterior Placenta Previa     , +accels, moderate variability, no decels  [place] mild irritability     A/P  IUP @ 33 weeks with known complete Previa with episode of vaginal bleeding - Second bleed  Admit to L &D  CEFM and [place]  IVF  NPO  Type and screen  Rescue dose of steroids  NICU consult  Discussed the indications for delivery - Primary CD- hemorrhage, nonreassuring tracing     Dossie Domenico REID

## 2022-09-05 NOTE — PROGRESS NOTES
4099~  Care assumed from [facility]. Call placed to Dr Chiquis Jiang in room. 1916~  Speculum exam.  Plan discussed. 3083~  Transfer to L&D 11.  Labs sent. [id number]  Delivery consent reviewed and signed by patient. 8781~  Booster dose of betamethasone given.

## 2022-09-05 NOTE — CONSULTS
94 [facility]  Prenatal Consult  [address] MRN: [id number] [facility]: [id number]  Note Date: 2022 Note Time: 12:15:00  Place of Service: Labor and DeliveryRequested By: Antonio Best MD  Reason for Consultation: Placenta previa with second bleeding episode, 33 wks gestation  Maternal History  : 1990Mother's Age: 31Blood Type: A PosMother's Race: White  RPR Serology: Non-ReactiveHIV: NegativeRubella:  Non-ImmuneGBS: Not DoneHBsAg: Negative  Prenatal Care: YesEDC OB:   Pregnancy Complications  Placenta previa w/ hemorrhage, 3rd trimester (O44.13)Comment: sentinel bleed , readmitted for bleeding     Urinary tract infection, 2nd trimester (O23.42)Comment: E. coli with neg CATARINO  Maternal Steroids Yes  Last Dose Date: 2022 at 10:21:00Next Recent Dose Date: 2022 at 09:52:00  Maternal Medications: Yes  Zoloft  Pregnancy Comment  Admitted with sentinel bleed from placenta previa on , received course BMZ at that time. Admitted  with recurrence of bleeding, BMZ booster given. Last US  with EFW 91st%. Present Plan  Continued hospitalization for monitoring of maternal & fetal well being and observation for additional bleeding. Anticipate bleeding for non-reassuring clinical status or recurrence of bleeding  Discussion/Counseling  Thank you for the opportunity to meet with Ms. Herminia Reyes, her  and her mother to discuss the challenges and logistics of a NICU stay at ~33wks. Recommendations  I have reviewed the mother`s medical chart and spoken with the obstetrician requesting this consult. I met with the mother,  and maternal grandmother. Compared to full term infants, premature infants share a somewhat increased risk for mortality and an increased risk for handicap if surviving though we discussed survival after delivery at 33wks approaches 100%.      We discussed that most any organ system can have problems related to prematurity. Some of the most common morbidities associated with this degree of prematurity, including but not limited to: respiratory distress syndrome requiring CPAP or intubation and mechanical ventilation,  the need for intravenous nutrition and gavage feedings. There is increased risk of neurodevelopmental delays or disabilities in infants born prematurely. Fortunately, the most severe injuries (severe IVH and or periventricular leukomalacia, severe ROP or blindness) are relatively uncommon at this gestation. I discussed the delivery room management and explained the personnel that will be present at delivery. I reviewed the plan for delayed cord clamping (if appropriate) and thermoregulation support. We discussed the various modes of respiratory management that are available in the delivery room, including PPV, CPAP, intubation and mechanical ventilation. We also discussed the use of artificial surfactant, which may or may not be needed. Some infants need other measures in their resuscitation (e.g. CPR, fluid, blood). We discussed that in their situation with a known placenta previa and history of bleeding that acute/severe blood loss can rapidly occur and that is is possible that their son could required emergent intubation, UVC placement and/or emergent blood transfusion at the time of delivery. I reiterated the close monitoring available with ongoing hospitalization and immediate proximity of the NICU and NICU team to respond to an emergent delivery. Both parents verbalized their desire for any life-saving measure, including an emergency blood transfusion. I emphasized that whenever possible we discuss procedures in detail before they occur but they understand that bleeding could neccesitate and emergent delivery under maternal general anesthesia which could limit our opportunity for discussion in real time.     Commonly, infants at this gestation need an umbilical catheter(s) to allow nutrition & monitoring. I reviewed the probable lab work and X-ray studies that will be obtained upon admission. When sufficiently stable, gavage feedings will be started. We discussed the critical importance of lactation to optimize outcome (improved neurodevelopment, reduced risk of NEC and infections among other things). We discussed how we will support mother's lactation. Mom plan's to breastfeed, has a pump for home use. We also discussed the availability of donor breastmilk as a bridge while mom's supply is established, vs a  formula bridge. Parents were provided with literature on the use of donor milk and they will consider this. We discussed typical length of stay and discharge goals. Time was allotted for the family to ask questions, and all questions were answered to the best of my ability, given the information provided. The family understands and agrees with the present plan. Thank you for inviting me to speak with the family. We remain available if the family desires further discussion or clarification. The total length of floor unit time was 30.00 minute(s). Counseling and/or coordination of care dominated more than fifty percent of the time.   Authenticated by: Krys Craig MD   Date/Time: 2022 14:22

## 2022-09-05 NOTE — PROGRESS NOTES
9/5/2022  5:05 AM  Pt arrived to [place] [id number] with c/o vaginal bleeding. Pt states it was a gush bright red blood and had soaked through a panty liner and also a few more clots on tissue paper while in the restroom. Pt states she is currently wearing a panty liner and feels like it is saturated again. Pt has hx of posterior placenta previa this pregnancy and states this isn't her first time having some bright red vaginal bleeding. Pt denies any ctx and LOF and +FM. Pt placed on FHR monitor and will assess. 3145: Bedside shift change report given to CHANTEL Kowalski RN (oncoming nurse) by Heather Wolff. Adrienne Cruz RN (offgoing nurse). Report included the following information SBAR.

## 2022-09-06 PROCEDURE — 65410000002 HC RM PRIVATE OB

## 2022-09-06 PROCEDURE — 99222 1ST HOSP IP/OBS MODERATE 55: CPT | Performed by: OBSTETRICS & GYNECOLOGY

## 2022-09-06 PROCEDURE — 74011250637 HC RX REV CODE- 250/637: Performed by: OBSTETRICS & GYNECOLOGY

## 2022-09-06 PROCEDURE — 59025 FETAL NON-STRESS TEST: CPT

## 2022-09-06 PROCEDURE — 76819 FETAL BIOPHYS PROFIL W/O NST: CPT | Performed by: OBSTETRICS & GYNECOLOGY

## 2022-09-06 PROCEDURE — 76805 OB US >/= 14 WKS SNGL FETUS: CPT | Performed by: OBSTETRICS & GYNECOLOGY

## 2022-09-06 RX ORDER — DOCUSATE SODIUM 100 MG/1
100 CAPSULE, LIQUID FILLED ORAL 2 TIMES DAILY
Status: DISCONTINUED | OUTPATIENT
Start: 2022-09-06 | End: 2022-09-15 | Stop reason: HOSPADM

## 2022-09-06 RX ADMIN — DOCUSATE SODIUM 100 MG: 100 CAPSULE, LIQUID FILLED ORAL at 17:54

## 2022-09-06 RX ADMIN — Medication 1 TABLET: at 09:14

## 2022-09-06 RX ADMIN — SERTRALINE 50 MG: 50 TABLET, FILM COATED ORAL at 09:15

## 2022-09-06 NOTE — PROGRESS NOTES
0740 Bedside report received from Maulik Moran RN. Pt resting, denies feeling new bleeding. 0800 Pt assisted to bathroom, stable on feet. Reports decreased blood on pad, small amount brb when wiping. No clots noted. 1027 Pt off monitor for transport to [facility]. 200 Pt returned from [facility] via wheelchair. 1500 Confirmed with Dr Kami Mcgarry to transfer to [address] REPORT:    Verbal report given to BRENDA Santiago RN(name) on Nighat Mario  being transferred to (unit) for routine progression of care       Report consisted of patients Situation, Background, Assessment and   Recommendations(SBAR). Information from the following report(s) SBAR, Intake/Output, and MAR was reviewed with the receiving nurse. Lines:   Peripheral IV 09/05/22 Anterior; Left Forearm (Active)   Site Assessment Clean, dry, & intact 09/05/22 0800   Phlebitis Assessment 0 09/05/22 0800   Infiltration Assessment 0 09/05/22 0800   Dressing Status Clean, dry, & intact 09/05/22 0800   Dressing Type Tape;Transparent 09/05/22 0800   Hub Color/Line Status Pink; Infusing 09/05/22 0800        Opportunity for questions and clarification was provided.       Patient transported with:   Registered Nurse

## 2022-09-06 NOTE — CONSULTS
MATERNAL FETAL MEDICINE  INPATIENT CONSULTATION    REQUESTED BY: Beverly Marrero DO   DATE OF CONSULT: 22    REASON FOR CONSULTATION: Vaginal bleeding    SOLITARIO Keyes is a 32 y.o.  at 33w1d admitted 22 for worsening of her chronic vaginal bleeding; patient has a known placenta previa. She was admitted 22 for VB and received a course of BMZ (@ 29 weeks); she states that she has been spotting ever since until yesterday when she noticed a gush of blood. She reports regular menses prior to this pregnancy. She states that she feels \"good\" on 50mg zoloft. She gets a rash with sulfa. She does not report other significant history.      Patient Active Problem List   Diagnosis Code    Placenta previa O44.00    Placenta previa antepartum in third trimester O44.03    Placenta previa antepartum O44.00       Past Medical History:   Diagnosis Date    Abnormal Papanicolaou smear of cervix     HPV hx of colposcopy in [facility]    Psychiatric problem     anxiety disorder       Past Surgical History:   Procedure Laterality Date    HX RHINOPLASTY      HX WISDOM TEETH EXTRACTION Bilateral        OB History    Para Term  AB Living   1 0 0 0 0 0   SAB IAB Ectopic Molar Multiple Live Births   0 0 0 0 0 0       Allergies   Allergen Reactions    Sulfa (Sulfonamide Antibiotics) Rash         Current Facility-Administered Medications:     lactated Ringers infusion, 125 mL/hr, IntraVENous, CONTINUOUS, Sanya Mcdonald MD, Stopped at 22 1435    lactated Ringers infusion 1,000 mL, 1,000 mL, IntraVENous, CONTINUOUS, Sanya Love MD    sertraline (ZOLOFT) tablet 50 mg, 50 mg, Oral, DAILY, Harshil Yusuf MD, 50 mg at 22 1038    sodium chloride (NS) flush 5-40 mL, 5-40 mL, IntraVENous, Q8H, Annabelle Elizabeth T, DO    sodium chloride (NS) flush 5-40 mL, 5-40 mL, IntraVENous, PRN, Efrain Alanisa T, DO    acetaminophen (TYLENOL) tablet 650 mg, 650 mg, Oral, Q4H PRN, Efrain Alanisa T, DO, 650 mg at 22 2130    ondansetron (ZOFRAN) injection 4 mg, 4 mg, IntraVENous, Q4H PRN, Elizabeth Alanis T, DO    diphenhydrAMINE (BENADRYL) injection 12.5 mg, 12.5 mg, IntraVENous, Q4H PRN, Elizabeth Alanis T, DO    docusate sodium (COLACE) capsule 100 mg, 100 mg, Oral, BID PRN, Elizabeth Alanis T, DO, 100 mg at 22 1419    prenatal vitamin tablet 1 Tablet, 1 Tablet, Oral, DAILY, Elizabeth Alanis, DO    Social History     Tobacco Use    Smoking status: Never    Smokeless tobacco: Never   Substance Use Topics    Alcohol use: Not Currently    Drug use: Never       Visit Vitals  /64   Pulse 86   Temp 98.4 °F (36.9 °C)   Resp 16   Ht 5' 11\" (1.803 m)   Wt 82.1 kg (181 lb)   SpO2 97%   Breastfeeding No   BMI 25.24 kg/m²       Gen: Comfortable, NAD  Resp: Comfortable respirations  CV: Well perfused  Ext: Moving all extremities well  Neuro: Alert, interactive, appropriate    22 H/H: 12.0/35.1    She reports a low-risk cfDNA result. ULTRASOUND:  22: Single viable IUP in vertex position with EFW = 2615g (77th%, AC > 95th%). Fetal anatomy seen appears WNL although the scan is globally limited by late gestational age/fetal lie. AFV appears WNL. There is a complete placenta previa. BPP 8/10 (-2 for inadequate breathing movements)    Anemia precautions reviewed. ASSESSMENT:    32 y. o. at 33w1d with continued vaginal bleeding with known placenta previa. Clinically stable. PLAN:    Would continue inpatient obs till 7 days of no VB  BPP in 1 week   S/P BMZ 8 & agree with repeat course  Goal of delivery 36-37 weeks (sooner with worsening clinical status)   Reassuring fetal status  S/P eyal consult    The patient visit was approximately 60 minutes with greater than half spent in face-to face counseling and coordination of care.     Carmen Armando M.D.

## 2022-09-06 NOTE — PROGRESS NOTES
1930 SBAR report received from JOELLE Viveros    2250 Dr Susy Ness called to review tracing. Order received to D/C monitor overnight unless bleeding resumes. 2252 Monitor off. Pt instructed to call for any vaginal bleeding or uncomf contractions    0608 EFM applied for routine tracing. Pt denies vaginal bleeding or contractions    0645 Pt OOB to BR- passed clot in toilet. Strip of  bright red blood noted on pad (not saturated through). Returned to bed. LR hung at 125/hr. Dr Susy Ness notified. Pt instructed not to eat until approved by MD    3339 Dr Susy Ness at bedside.  Order placed for MFM consult    2717 Bedside SBAR report to Silvano Pineda RN

## 2022-09-06 NOTE — PROGRESS NOTES
AntePartum Progress Note    iK Rogel  33w1d    Patient admitted for placenta previa 33w1d Estimated Date of Delivery: 10/24/22 states she does have mild vaginal bleeding. Pt reports that last night bleeding was light but then this morning she woke up and went to the bathroom and passed a clot in the toilet and had another small clot on her pad. Most recent check on pad was light amount of dark red blood. Feels small amount of cramping, but not feeling contractions. Good fetal movement. Vitals:  Patient Vitals for the past 24 hrs:   BP Temp Pulse Resp SpO2   22 0806 108/64 98.4 °F (36.9 °C) 86 16 --   22 0609 110/61 98.1 °F (36.7 °C) 88 -- --   22 1935 117/70 98.5 °F (36.9 °C) 100 16 97 %   22 1624 119/71 98.1 °F (36.7 °C) 89 14 --   22 1213 116/77 98.8 °F (37.1 °C) 85 16 96 %   22 1209 116/77 -- 85 -- --     Temp (24hrs), Av.4 °F (36.9 °C), Min:98.1 °F (36.7 °C), Max:98.8 °F (37.1 °C)    I&O:   No intake/output data recorded.  1901 -  0700  In: 1205.7 [I.V.:1205.7]  Out: -   NST:  Reactive  Uterine Activity: Frequency: Every 5-15 minutes and Intensity: mild, pt only feels crampy    Exam:  Patient without distress. Abdomen soft, non-tender               Fundus soft and non tender               Lower extremities edema No                                           Labs: No results found for this or any previous visit (from the past 24 hour(s)). Assessment and Plan:   IUP @ 33w1d   Patient Active Problem List    Diagnosis Date Noted    Placenta previa antepartum in third trimester 2022    Placenta previa antepartum 2022    Placenta previa 2022     Known placenta previa- s/p 2nd bleed    - Received booster dose of steroids yesterday- had been previously BMTZ complete from inpatient stay 2 wks ago  - NICU consult completed yesterday  - Continue maintenance IVF.  Bolus this morning with 500cc  - Keep large bore IV in place.   - Active T&S q3 days  - Consult with MFM today with ultrasound to get EFW  - NPO until after [facility]

## 2022-09-06 NOTE — PROGRESS NOTES
1519: TRANSFER - IN REPORT:    Verbal report received from 1355 Darian Hernandez RN(name) on Carlotta Wright  being received from L&D(unit) for routine progression of care      Report consisted of patients Situation, Background, Assessment and   Recommendations(SBAR). Information from the following report(s) SBAR, Kardex, Intake/Output, MAR, Accordion, Recent Results, and Med Rec Status was reviewed with the receiving nurse. Opportunity for questions and clarification was provided. Assessment completed upon patients arrival to unit and care assumed. 1743: Pt transferred to room# 317 due to personal preference.

## 2022-09-07 PROCEDURE — 65410000002 HC RM PRIVATE OB

## 2022-09-07 PROCEDURE — 74011250637 HC RX REV CODE- 250/637: Performed by: OBSTETRICS & GYNECOLOGY

## 2022-09-07 PROCEDURE — 74011000250 HC RX REV CODE- 250: Performed by: OBSTETRICS & GYNECOLOGY

## 2022-09-07 PROCEDURE — 59025 FETAL NON-STRESS TEST: CPT

## 2022-09-07 RX ADMIN — DOCUSATE SODIUM 100 MG: 100 CAPSULE, LIQUID FILLED ORAL at 17:38

## 2022-09-07 RX ADMIN — DOCUSATE SODIUM 100 MG: 100 CAPSULE, LIQUID FILLED ORAL at 10:34

## 2022-09-07 RX ADMIN — SODIUM CHLORIDE, PRESERVATIVE FREE 10 ML: 5 INJECTION INTRAVENOUS at 17:38

## 2022-09-07 RX ADMIN — SERTRALINE 50 MG: 50 TABLET, FILM COATED ORAL at 09:05

## 2022-09-07 RX ADMIN — Medication 1 TABLET: at 09:05

## 2022-09-07 NOTE — PROGRESS NOTES
Bedside and Verbal shift change report given to Olivia Fierro RN (oncoming nurse) by Miesha Arriaga (offgoing nurse). Report included the following information SBAR, Kardex, Intake/Output, MAR, and Recent Results. 1606: Patient called out complaining of cramping, placed pt on monitor. Bladder recently emptied, patient drinking PO fluid without difficulty. 1625: Called Dr. Debi Preston as monitor has traced contractions q3-4 min patient stating she can feel them. Informed MD that bleeding has remained the same. MD stated to keep pt on monitor for a little longer and we will reassess. No new orders at this time. 1630: Patient emptied bladder again, placed pt back on monitor. 1725:  Since pt emptied bladder for second time, no further contractions traced or felt by pt. Monitoring stopped at this time, advised pt to call out if she feels them again or if she notices any changing in her bleeding. Pt verbalized understanding.

## 2022-09-07 NOTE — PROGRESS NOTES
AntePartum Progress Note    Faustina Dudley  33w2d    Patient admitted for placenta previa and vaginal bleeding 33w2d Estimated Date of Delivery: 10/24/22 states she does have light spotting and NL FM and does not  have  abdominal pain  , contractions, and cramping . Vitals:  Patient Vitals for the past 24 hrs:   BP Temp Pulse Resp SpO2   22 0030 108/64 98.6 °F (37 °C) 96 16 96 %   22 2054 113/74 98.2 °F (36.8 °C) 88 16 97 %   22 1520 112/72 98.4 °F (36.9 °C) 92 16 96 %   22 1434 -- 98.1 °F (36.7 °C) -- 16 --     Temp (24hrs), Av.3 °F (36.8 °C), Min:98.1 °F (36.7 °C), Max:98.6 °F (37 °C)    I&O:   No intake/output data recorded.  1901 -  0700  In: 480 [P.O.:480]  Out: -   NST:  pending today    Exam:  Patient without distress. Abdomen soft, non-tender               Fundus soft and non tender               Lower extremities edema No                                           Labs: No results found for this or any previous visit (from the past 24 hour(s)). Assessment and Plan:   IUP @ 33w2d   Patient Active Problem List    Diagnosis Date Noted    Placenta previa antepartum in third trimester 2022    Placenta previa antepartum 2022    Placenta previa 2022     Known placenta previa- s/p 2nd bleed     - Received booster dose of steroids- had been previously BMTZ complete from inpatient stay 2 wks ago  - NICU and MFM consults completed yesterday  - Continue maintenance IVF. Bolus this morning with 500cc  - Keep large bore IV in place. - Active T&S q3 days  - Per MFM, needs to be completely free of bleeding for 7 days in order to consider a possible discharge. Reviewed with pt that she will be here for a longer stay and quite possibly until delivery.   Goal for delivery is 36-37 weeks

## 2022-09-07 NOTE — PROGRESS NOTES
Bedside and Verbal shift change report given to BALWINDER Alcaraz RN (oncoming nurse) by OMAR Santiago RN (offgoing nurse). Report included the following information SBAR, Kardex, and Procedure Summary.

## 2022-09-08 LAB
ABO + RH BLD: NORMAL
BLOOD GROUP ANTIBODIES SERPL: NORMAL
SPECIMEN EXP DATE BLD: NORMAL

## 2022-09-08 PROCEDURE — 36415 COLL VENOUS BLD VENIPUNCTURE: CPT

## 2022-09-08 PROCEDURE — 86900 BLOOD TYPING SEROLOGIC ABO: CPT

## 2022-09-08 PROCEDURE — 59025 FETAL NON-STRESS TEST: CPT

## 2022-09-08 PROCEDURE — 74011250637 HC RX REV CODE- 250/637: Performed by: OBSTETRICS & GYNECOLOGY

## 2022-09-08 PROCEDURE — 65410000002 HC RM PRIVATE OB

## 2022-09-08 PROCEDURE — 74011000250 HC RX REV CODE- 250: Performed by: OBSTETRICS & GYNECOLOGY

## 2022-09-08 RX ADMIN — SODIUM CHLORIDE, PRESERVATIVE FREE 5 ML: 5 INJECTION INTRAVENOUS at 21:45

## 2022-09-08 RX ADMIN — DOCUSATE SODIUM 100 MG: 100 CAPSULE, LIQUID FILLED ORAL at 08:31

## 2022-09-08 RX ADMIN — DOCUSATE SODIUM 100 MG: 100 CAPSULE, LIQUID FILLED ORAL at 17:44

## 2022-09-08 RX ADMIN — Medication 1 TABLET: at 08:31

## 2022-09-08 RX ADMIN — SERTRALINE 50 MG: 50 TABLET, FILM COATED ORAL at 08:31

## 2022-09-08 RX ADMIN — SODIUM CHLORIDE, PRESERVATIVE FREE 10 ML: 5 INJECTION INTRAVENOUS at 14:00

## 2022-09-08 NOTE — PROGRESS NOTES
2667: Bedside and Verbal shift change report given to MARINA (oncoming nurse) by Laura Baca (offgoing nurse). Report included the following information SBAR, Kardex, Intake/Output, MAR, Accordion, Recent Results, and Med Rec Status.

## 2022-09-08 NOTE — PROGRESS NOTES
AntePartum Progress Note    Aixa Raymond  33w3d    Patient admitted for vaginal bleeding 33w3d Estimated Date of Delivery: 10/24/22 states she does have normal fetal movement. Pt notes that the only bleeding she has had is a small amount of dark blood spotting. Vitals:  Patient Vitals for the past 24 hrs:   BP Temp Pulse Resp SpO2   22 0802 114/72 98 °F (36.7 °C) 87 16 97 %   22 0008 103/69 98.1 °F (36.7 °C) 89 14 --   22 2056 123/69 98.2 °F (36.8 °C) 94 16 97 %   22 1534 110/69 97.9 °F (36.6 °C) 93 16 97 %     Temp (24hrs), Av.1 °F (36.7 °C), Min:97.9 °F (36.6 °C), Max:98.2 °F (36.8 °C)    I&O:   No intake/output data recorded.  1901 -  0700  In: 9377 [P.O.:1820]  Out: -   NST:  Reactive- last night. Pending for this morning. Uterine Activity: None    Exam:  Patient without distress. Abdomen soft, non-tender               Fundus soft and non tender               Lower extremities edema No                                           Labs:   Recent Results (from the past 24 hour(s))   TYPE & SCREEN    Collection Time: 22  6:36 AM   Result Value Ref Range    Crossmatch Expiration 2022,2359     ABO/Rh(D) A POSITIVE     Antibody screen NEG        Assessment and Plan:   IUP @ 33w3d   Patient Active Problem List    Diagnosis Date Noted    Placenta previa antepartum in third trimester 2022    Placenta previa antepartum 2022    Placenta previa 2022       Known placenta previa- s/p 2nd bleed     - Received booster dose of steroids- had been previously BMTZ complete from inpatient stay 2 wks ago  - NICU and MFM consults completed yesterday  - Continue to encourage PO fluids  - Keep large bore IV in place. - Active T&S q3 days  - Per MFM, needs to be completely free of bleeding for 7 days in order to consider a possible discharge. Reviewed with pt that she will be here for a longer stay and quite possibly until delivery. Goal for delivery is 36-37 weeks

## 2022-09-08 NOTE — PROGRESS NOTES
Bedside and Verbal shift change report given to BALWINDER Mckeon RN (oncoming nurse) by Isidra Flores RN (offgoing nurse). Report included the following information SBAR and Kardex.

## 2022-09-09 PROCEDURE — 59025 FETAL NON-STRESS TEST: CPT

## 2022-09-09 PROCEDURE — 74011250637 HC RX REV CODE- 250/637: Performed by: OBSTETRICS & GYNECOLOGY

## 2022-09-09 PROCEDURE — 65410000002 HC RM PRIVATE OB

## 2022-09-09 PROCEDURE — 74011000250 HC RX REV CODE- 250: Performed by: OBSTETRICS & GYNECOLOGY

## 2022-09-09 RX ADMIN — DOCUSATE SODIUM 100 MG: 100 CAPSULE, LIQUID FILLED ORAL at 18:09

## 2022-09-09 RX ADMIN — SODIUM CHLORIDE, PRESERVATIVE FREE 10 ML: 5 INJECTION INTRAVENOUS at 18:11

## 2022-09-09 RX ADMIN — Medication 1 TABLET: at 08:57

## 2022-09-09 RX ADMIN — SERTRALINE 50 MG: 50 TABLET, FILM COATED ORAL at 08:58

## 2022-09-09 RX ADMIN — SODIUM CHLORIDE, PRESERVATIVE FREE 10 ML: 5 INJECTION INTRAVENOUS at 22:00

## 2022-09-09 RX ADMIN — DOCUSATE SODIUM 100 MG: 100 CAPSULE, LIQUID FILLED ORAL at 08:57

## 2022-09-09 NOTE — PROGRESS NOTES
Bedside and Verbal shift change report given to BLANCA Omalley RN (oncoming nurse) by Sukhdeep Solis RN (offgoing nurse). Report included the following information SBAR and Kardex.

## 2022-09-09 NOTE — PROGRESS NOTES
AntePartum Progress Note    William Velez  85D2C    Patient admitted for vaginal bleeding 33w4d Estimated Date of Delivery: 10/24/22 states she does have normal fetal movement. Had more dark red bloody mucus discharge in toilet this AM. Examined and small dark bloody clot noted in toilet. Denies any further bleeding. Denies contractions or LOF    Vitals:  Patient Vitals for the past 24 hrs:   BP Temp Pulse Resp SpO2   22 0048 110/69 98.4 °F (36.9 °C) 95 16 97 %   22 2020 113/78 98.2 °F (36.8 °C) 90 18 97 %   22 1528 103/71 98.4 °F (36.9 °C) 100 18 97 %       Temp (24hrs), Av.3 °F (36.8 °C), Min:98.2 °F (36.8 °C), Max:98.4 °F (36.9 °C)    I&O:   No intake/output data recorded.  1901 -  0700  In: 1340 [P.O.:1340]  Out: -   NST:  from 9/8  moderate variability, present accels, no decels  Uterine Activity: 1 in 20 min    Exam:  Patient without distress. Abdomen soft, non-tender               Fundus soft and non tender               Lower extremities edema No                                22: Single viable IUP in vertex position with EFW = 2615g (77th%, AC > 95th%). Fetal anatomy seen appears WNL although the scan is globally limited by late gestational age/fetal lie. AFV appears WNL. There is a complete placenta previa. BPP 8/10 (-2 for inadequate breathing movements)           Labs:   No results found for this or any previous visit (from the past 24 hour(s)). Assessment and Plan:   IUP @ 33w4d   Patient Active Problem List    Diagnosis Date Noted    Placenta previa antepartum in third trimester 2022    Placenta previa antepartum 2022    Placenta previa 2022       Known placenta previa- s/p 2nd bleed     - Received booster dose of steroids- had been previously BMTZ complete from inpatient stay 2 wks ago  - NICU and MFM consults done  - Continue to encourage PO fluids  - Keep large bore IV in place.   - Active T&S q3 days  - Per MFM, needs to be completely free of bleeding for 7 days in order to consider a possible discharge. Reviewed with pt that she will be here for a longer stay and quite possibly until delivery. Goal for delivery is 36-37 weeks.  Currently working on scheduling [address] for Friday Sept 30th 36w4d  -Pt may have wheelchair privileges to leave the unit [facility] MD   9/9/2022  8:11 AM

## 2022-09-09 NOTE — PROGRESS NOTES
Bedside and Verbal shift change report given to BALWINDER Smith RN (oncoming nurse) by OMAR Santiago RN (offgoing nurse). Report included the following information SBAR and Kardex.

## 2022-09-09 NOTE — PROGRESS NOTES
Bedside and Verbal shift change report given to Maida Almanzar RN (oncoming nurse) by BLANCA Clayton RN (offgoing nurse). Report included the following information SBAR, Kardex, Intake/Output, MAR, and Recent Results.

## 2022-09-09 NOTE — PROGRESS NOTES
CATARINO: Anticipate discharge home after delivery of baby on 9/30. Transportation in car with significant other. Reason for Readmission:   Placenta previa presented with vaginal bleeding            RUR Score/Risk Level:     7%    PCP: First and Last name:  Laureen Bose   Name of Practice: [facility]: [phone]   Are you a current patient: Yes/No: Yes    Approximate date of last visit:    Can you participate in a virtual visit with your PCP:     Is a Care Conference indicated:  No      Did you attend your follow up appointment (s): If not, why not: N/A         Resources/supports as identified by patient/family:   Supportive spouse, has access to care        Top Challenges facing patient (as identified by patient/family and CM): Finances/Medication cost?  No     Transportation    In car   Support system or lack thereof? Family and spousal support       Living arrangements? Home     Self-care/ADLs/Cognition? Able to care for themselves. No other concerns           Current Advanced Directive/Advance Care Plan:             Plan for utilizing home health:   N/A             Transition of Care Plan:    Based on readmission, the patient's previous Plan of Care has been evaluated and/or modified. The current Transition of Care Plan is:       CM met with patient at bedside. Patient was alert and oriented. Patients , Paul Pacheco was present in the room. CM confirmed patient information. Patient and  lives in two story home with 12-14 steps. Patient does not need DME. Patients emergency contact is her , Kitty Garcia [phone]. PCP confirmed and pharmacy used is   [facility]  [address] [place] [address] Road  [phone]  No discharge concerns pending delivery of baby on 9/30. Care Management Interventions  PCP Verified by CM: Yes Laureen Bose)  Palliative Care Criteria Met (RRAT>21 & CHF Dx)?: No  Mode of Transport at Discharge:  Other (see comment) (in car with )  [address] Adenike Hongjo: Yes  Discharge Durable Medical Equipment: No  Physical Therapy Consult: No  Occupational Therapy Consult: No  Speech Therapy Consult: No  Support Systems: Spouse/Significant Other  Confirm Follow Up Transport: Family  The Plan for Transition of Care is Related to the Following Treatment Goals : Home with  pending delivery of baby on 9/30  Discharge Location  Patient Expects to be Discharged to[de-identified] Home with family assistance     Readmission Assessment  Number of days since last admission?: 8-30 days (8/12-8/16 at [facility] with Placenta previa antepartum in third trimester)  Previous disposition: Home with Family  Who is being interviewed?: Patient  What was the patient's/caregiver's perception as to why they think they needed to return back to the hospital?: Other (Comment) (On going medical issue)  Did you visit your Primary Care Physician after you left the hospital, before you returned this time?: No  Why weren't you able to visit your PCP?: Did not have an appointment  Did you see a specialist, such as Cardiac, Pulmonary, Orthopedic Physician, etc. after you left the hospital?: Yes  Who advised the patient to return to the hospital?: Physician, Self-referral  Does the patient report anything that got in the way of taking their medications?: No  In our efforts to provide the best possible care to you and others like you, can you think of anything that we could have done to help you after you left the hospital the first time, so that you might not have needed to return so soon?: Other (Comment) (on going medical care)     [address] [facility] Intern

## 2022-09-10 PROCEDURE — 74011250637 HC RX REV CODE- 250/637: Performed by: OBSTETRICS & GYNECOLOGY

## 2022-09-10 PROCEDURE — 65410000002 HC RM PRIVATE OB

## 2022-09-10 PROCEDURE — 74011000250 HC RX REV CODE- 250: Performed by: OBSTETRICS & GYNECOLOGY

## 2022-09-10 PROCEDURE — 59025 FETAL NON-STRESS TEST: CPT

## 2022-09-10 RX ADMIN — Medication 1 TABLET: at 10:10

## 2022-09-10 RX ADMIN — DOCUSATE SODIUM 100 MG: 100 CAPSULE, LIQUID FILLED ORAL at 10:10

## 2022-09-10 RX ADMIN — DOCUSATE SODIUM 100 MG: 100 CAPSULE, LIQUID FILLED ORAL at 17:47

## 2022-09-10 RX ADMIN — SODIUM CHLORIDE, PRESERVATIVE FREE 5 ML: 5 INJECTION INTRAVENOUS at 22:00

## 2022-09-10 RX ADMIN — SERTRALINE 50 MG: 50 TABLET, FILM COATED ORAL at 10:10

## 2022-09-10 NOTE — PROGRESS NOTES
High Risk Obstetrics Progress Note    Name: Jair Kim MRN: [id number]  SSN: [id number]    YOB: 1990  Age: 32 y.o. Sex: female      Subjective:      LOS: 5 days    Estimated Date of Delivery: 10/24/22   Gestational Age Today: 33w5d     Patient admitted for  2nd episode of vaginal bleeding with known previa . States she does have normal fetal movement and only dark spotting since small clot she passed yesterday morning  and does not have chest pain and shortness of breath. Objective:     Vitals:  Blood pressure 106/72, pulse 84, temperature 98 °F (36.7 °C), resp. rate 18, height 5' 11\" (1.803 m), weight 82.1 kg (181 lb), SpO2 97 %, not currently breastfeeding. Temp (24hrs), Av.7 °F (36.5 °C), Min:97.3 °F (36.3 °C), Max:98 °F (96.9 °C)    Systolic (38FZA), OZX:566 , Min:105 , WDW:613      Diastolic (35LXT), OBL:75, Min:66, Max:78       Intake and Output:         Physical Exam:  Patient without distress. Heart: Regular rate and rhythm  Lung: clear to auscultation throughout lung fields, no wheezes, no rales, no rhonchi, and normal respiratory effort  Abdomen: soft, nontender, gravid  Lower Extremities:  - Edema No       Membranes:  Intact    Today's NST pending      Labs: No results found for this or any previous visit (from the past 36 hour(s)). Assessment and Plan: Active Problems:    Placenta previa antepartum in third trimester (2022)      Placenta previa antepartum (2022)       Known placenta previa- s/p 2nd bleed     - Received booster dose of steroids- had been previously BMTZ complete from inpatient stay 2 wks ago  - NICU and MFM consults done  - Continue to encourage PO fluids  - Keep large bore IV in place. - Active T&S q3 days  - BID NST  - Per MFM, needs to be completely free of bleeding for 7 days in order to consider a possible discharge. Pt aware that she will be here for a longer stay and quite possibly until delivery.   Goal for delivery is 36-37 weeks. Currently working on scheduling [address] for Friday Sept 30th 36w4d  -Pt may have wheelchair privileges to leave the unit to go to cafeteria and courtyard

## 2022-09-10 NOTE — PROGRESS NOTES
Bedside and Verbal shift change report given to BALWINDER Alba RN  (oncoming nurse) by Khadijah Navarro  (offgoing nurse). Report included the following information SBAR, Kardex, and MAR.

## 2022-09-10 NOTE — PROGRESS NOTES
Bedside and Verbal shift change report given to BALWINDER Chanel RN  (oncoming nurse) by Sana Reed. Estrella Hubbard RN  (offgoing nurse). Report included the following information SBAR, Kardex, and MAR.

## 2022-09-11 LAB
ABO + RH BLD: NORMAL
BLOOD GROUP ANTIBODIES SERPL: NORMAL
SPECIMEN EXP DATE BLD: NORMAL

## 2022-09-11 PROCEDURE — 86900 BLOOD TYPING SEROLOGIC ABO: CPT

## 2022-09-11 PROCEDURE — 59025 FETAL NON-STRESS TEST: CPT

## 2022-09-11 PROCEDURE — 74011250637 HC RX REV CODE- 250/637: Performed by: OBSTETRICS & GYNECOLOGY

## 2022-09-11 PROCEDURE — 86644 CMV ANTIBODY: CPT

## 2022-09-11 PROCEDURE — 36415 COLL VENOUS BLD VENIPUNCTURE: CPT

## 2022-09-11 PROCEDURE — 86923 COMPATIBILITY TEST ELECTRIC: CPT

## 2022-09-11 PROCEDURE — 65410000002 HC RM PRIVATE OB

## 2022-09-11 RX ADMIN — DOCUSATE SODIUM 100 MG: 100 CAPSULE, LIQUID FILLED ORAL at 09:45

## 2022-09-11 RX ADMIN — SERTRALINE 50 MG: 50 TABLET, FILM COATED ORAL at 09:45

## 2022-09-11 RX ADMIN — Medication 1 TABLET: at 09:45

## 2022-09-11 RX ADMIN — DOCUSATE SODIUM 100 MG: 100 CAPSULE, LIQUID FILLED ORAL at 18:19

## 2022-09-11 NOTE — PROGRESS NOTES
High Risk Obstetrics Progress Note    Name: Aixa Raymond MRN: [id number]  SSN: [id number]    YOB: 1990  Age: 32 y.o. Sex: female      Subjective:      LOS: 6 days    Estimated Date of Delivery: 10/24/22   Gestational Age Today: 32w10d     Patient admitted for  2nd bleed with known previa . States she does have  dark spotting and normal fetal movement  and does not have chest pain, contractions, and shortness of breath. Objective:     Vitals:  Blood pressure 104/66, pulse 92, temperature 98 °F (36.7 °C), resp. rate 16, height 5' 11\" (1.803 m), weight 82.1 kg (181 lb), SpO2 98 %, not currently breastfeeding. Temp (24hrs), Av °F (36.7 °C), Min:97.7 °F (36.5 °C), Max:98.2 °F (87.8 °C)    Systolic (15UBA), HVO:728 , Min:104 , DLN:470      Diastolic (08TSP), JXZ:57, Min:66, Max:81       Intake and Output:         Physical Exam:  Patient without distress. Heart: Regular rate and rhythm  Lung: clear to auscultation throughout lung fields, no wheezes, no rales, no rhonchi, and normal respiratory effort  Abdomen: soft, nontender, gravid  Lower Extremities:  - Edema No       Membranes:  Intact    Uterine Activity:  Frequency: Every 7+ minutes (pt not feeling them)    Fetal Heart Rate:  Reactive  Baseline: 150s per minute  Variability: moderate  Accelerations: yes  Decelerations: none        Labs:   Recent Results (from the past 36 hour(s))   TYPE & SCREEN    Collection Time: 22 12:11 AM   Result Value Ref Range    Crossmatch Expiration 2022,2359     ABO/Rh(D) A POSITIVE     Antibody screen NEG        Assessment and Plan:       Active Problems:    Placenta previa antepartum in third trimester (2022)      Placenta previa antepartum (2022)       33 y/o G1 at 33 6/7 weeks with Known placenta previa- s/p 2nd bleed   -spotting continues  - Received booster dose of steroids- had been previously BMTZ complete from inpatient stay 2 wks ago  - NICU and MFM consults done  - Continue to encourage PO fluids  - Keep large bore IV in place. - Active T&S q3 days  - BID NST  - Per MFM, needs to be completely free of bleeding for 7 days in order to consider a possible discharge. Pt aware that she will be here for a longer stay and quite possibly until delivery. Goal for delivery is 36-37 weeks.  Currently working on scheduling [address] for Friday Sept 30th 36w4d  -Pt may have wheelchair privileges to leave the unit to go to cafeteria and courtyard

## 2022-09-12 ENCOUNTER — ANESTHESIA EVENT (OUTPATIENT)
Dept: LABOR AND DELIVERY | Age: 32
End: 2022-09-12
Payer: COMMERCIAL

## 2022-09-12 ENCOUNTER — ANESTHESIA (OUTPATIENT)
Dept: LABOR AND DELIVERY | Age: 32
End: 2022-09-12
Payer: COMMERCIAL

## 2022-09-12 LAB — HISTORY CHECKED?,CKHIST: NORMAL

## 2022-09-12 PROCEDURE — 76010000392 HC C SECN EA ADDL 0.5 HR: Performed by: OBSTETRICS & GYNECOLOGY

## 2022-09-12 PROCEDURE — 74011250637 HC RX REV CODE- 250/637: Performed by: STUDENT IN AN ORGANIZED HEALTH CARE EDUCATION/TRAINING PROGRAM

## 2022-09-12 PROCEDURE — 74011250637 HC RX REV CODE- 250/637: Performed by: OBSTETRICS & GYNECOLOGY

## 2022-09-12 PROCEDURE — 74011250636 HC RX REV CODE- 250/636: Performed by: NURSE ANESTHETIST, CERTIFIED REGISTERED

## 2022-09-12 PROCEDURE — 74011000250 HC RX REV CODE- 250: Performed by: STUDENT IN AN ORGANIZED HEALTH CARE EDUCATION/TRAINING PROGRAM

## 2022-09-12 PROCEDURE — 75410000003 HC RECOV DEL/VAG/CSECN EA 0.5 HR: Performed by: OBSTETRICS & GYNECOLOGY

## 2022-09-12 PROCEDURE — 76010000391 HC C SECN FIRST 1 HR: Performed by: OBSTETRICS & GYNECOLOGY

## 2022-09-12 PROCEDURE — 74011250636 HC RX REV CODE- 250/636: Performed by: OBSTETRICS & GYNECOLOGY

## 2022-09-12 PROCEDURE — 74011000250 HC RX REV CODE- 250: Performed by: OBSTETRICS & GYNECOLOGY

## 2022-09-12 PROCEDURE — 65270000029 HC RM PRIVATE

## 2022-09-12 PROCEDURE — 88307 TISSUE EXAM BY PATHOLOGIST: CPT

## 2022-09-12 PROCEDURE — 59025 FETAL NON-STRESS TEST: CPT

## 2022-09-12 PROCEDURE — 74011250636 HC RX REV CODE- 250/636: Performed by: STUDENT IN AN ORGANIZED HEALTH CARE EDUCATION/TRAINING PROGRAM

## 2022-09-12 PROCEDURE — 76060000078 HC EPIDURAL ANESTHESIA: Performed by: OBSTETRICS & GYNECOLOGY

## 2022-09-12 RX ORDER — OXYTOCIN/RINGER'S LACTATE 30/500 ML
PLASTIC BAG, INJECTION (ML) INTRAVENOUS
Status: COMPLETED
Start: 2022-09-12 | End: 2022-09-12

## 2022-09-12 RX ORDER — FENTANYL CITRATE 50 UG/ML
INJECTION, SOLUTION INTRAMUSCULAR; INTRAVENOUS
Status: COMPLETED | OUTPATIENT
Start: 2022-09-12 | End: 2022-09-12

## 2022-09-12 RX ORDER — BUPIVACAINE HYDROCHLORIDE 7.5 MG/ML
INJECTION, SOLUTION EPIDURAL; RETROBULBAR
Status: COMPLETED | OUTPATIENT
Start: 2022-09-12 | End: 2022-09-12

## 2022-09-12 RX ORDER — SODIUM CHLORIDE, SODIUM LACTATE, POTASSIUM CHLORIDE, CALCIUM CHLORIDE 600; 310; 30; 20 MG/100ML; MG/100ML; MG/100ML; MG/100ML
INJECTION, SOLUTION INTRAVENOUS
Status: DISCONTINUED | OUTPATIENT
Start: 2022-09-12 | End: 2022-09-12 | Stop reason: HOSPADM

## 2022-09-12 RX ORDER — OXYTOCIN/RINGER'S LACTATE 30/500 ML
PLASTIC BAG, INJECTION (ML) INTRAVENOUS
Status: DISCONTINUED | OUTPATIENT
Start: 2022-09-12 | End: 2022-09-12 | Stop reason: HOSPADM

## 2022-09-12 RX ORDER — KETOROLAC TROMETHAMINE 30 MG/ML
INJECTION, SOLUTION INTRAMUSCULAR; INTRAVENOUS AS NEEDED
Status: DISCONTINUED | OUTPATIENT
Start: 2022-09-12 | End: 2022-09-12 | Stop reason: HOSPADM

## 2022-09-12 RX ORDER — DEXAMETHASONE SODIUM PHOSPHATE 4 MG/ML
INJECTION, SOLUTION INTRA-ARTICULAR; INTRALESIONAL; INTRAMUSCULAR; INTRAVENOUS; SOFT TISSUE AS NEEDED
Status: DISCONTINUED | OUTPATIENT
Start: 2022-09-12 | End: 2022-09-12 | Stop reason: HOSPADM

## 2022-09-12 RX ORDER — SODIUM CHLORIDE 9 MG/ML
250 INJECTION, SOLUTION INTRAVENOUS AS NEEDED
Status: DISCONTINUED | OUTPATIENT
Start: 2022-09-12 | End: 2022-09-15 | Stop reason: HOSPADM

## 2022-09-12 RX ORDER — SODIUM CHLORIDE, SODIUM LACTATE, POTASSIUM CHLORIDE, CALCIUM CHLORIDE 600; 310; 30; 20 MG/100ML; MG/100ML; MG/100ML; MG/100ML
125 INJECTION, SOLUTION INTRAVENOUS CONTINUOUS
Status: DISCONTINUED | OUTPATIENT
Start: 2022-09-12 | End: 2022-09-15 | Stop reason: HOSPADM

## 2022-09-12 RX ORDER — MORPHINE SULFATE 1 MG/ML
INJECTION, SOLUTION EPIDURAL; INTRATHECAL; INTRAVENOUS
Status: COMPLETED | OUTPATIENT
Start: 2022-09-12 | End: 2022-09-12

## 2022-09-12 RX ADMIN — CEFAZOLIN 2 G: 1 INJECTION, POWDER, FOR SOLUTION INTRAMUSCULAR; INTRAVENOUS at 20:05

## 2022-09-12 RX ADMIN — SODIUM CHLORIDE, PRESERVATIVE FREE 10 ML: 5 INJECTION INTRAVENOUS at 17:18

## 2022-09-12 RX ADMIN — SODIUM CHLORIDE, POTASSIUM CHLORIDE, SODIUM LACTATE AND CALCIUM CHLORIDE: 600; 310; 30; 20 INJECTION, SOLUTION INTRAVENOUS at 20:03

## 2022-09-12 RX ADMIN — KETOROLAC TROMETHAMINE 30 MG: 30 INJECTION, SOLUTION INTRAMUSCULAR; INTRAVENOUS at 21:06

## 2022-09-12 RX ADMIN — DEXAMETHASONE SODIUM PHOSPHATE 4 MG: 4 INJECTION, SOLUTION INTRAMUSCULAR; INTRAVENOUS at 20:23

## 2022-09-12 RX ADMIN — SERTRALINE 50 MG: 50 TABLET, FILM COATED ORAL at 10:01

## 2022-09-12 RX ADMIN — DOCUSATE SODIUM 100 MG: 100 CAPSULE, LIQUID FILLED ORAL at 10:01

## 2022-09-12 RX ADMIN — Medication 1 TABLET: at 10:01

## 2022-09-12 RX ADMIN — SODIUM CHLORIDE 10 MCG/MIN: 9 INJECTION, SOLUTION INTRAVENOUS at 20:16

## 2022-09-12 RX ADMIN — MORPHINE SULFATE 0.25 MG: 1 INJECTION EPIDURAL; INTRATHECAL; INTRAVENOUS at 20:19

## 2022-09-12 RX ADMIN — DOCUSATE SODIUM 100 MG: 100 CAPSULE, LIQUID FILLED ORAL at 18:17

## 2022-09-12 RX ADMIN — FENTANYL CITRATE 15 MCG: 50 INJECTION, SOLUTION INTRAMUSCULAR; INTRAVENOUS at 20:19

## 2022-09-12 RX ADMIN — BUPIVACAINE HYDROCHLORIDE 12 MG: 7.5 INJECTION, SOLUTION EPIDURAL; RETROBULBAR at 20:19

## 2022-09-12 RX ADMIN — SODIUM CITRATE AND CITRIC ACID MONOHYDRATE 30 ML: 500; 334 SOLUTION ORAL at 20:04

## 2022-09-12 RX ADMIN — ONDANSETRON HYDROCHLORIDE 4 MG: 2 INJECTION, SOLUTION INTRAMUSCULAR; INTRAVENOUS at 20:16

## 2022-09-12 RX ADMIN — SODIUM CHLORIDE, POTASSIUM CHLORIDE, SODIUM LACTATE AND CALCIUM CHLORIDE 1000 ML: 600; 310; 30; 20 INJECTION, SOLUTION INTRAVENOUS at 19:35

## 2022-09-12 RX ADMIN — SODIUM CHLORIDE, POTASSIUM CHLORIDE, SODIUM LACTATE AND CALCIUM CHLORIDE 125 ML/HR: 600; 310; 30; 20 INJECTION, SOLUTION INTRAVENOUS at 21:50

## 2022-09-12 RX ADMIN — Medication 909 ML/HR: at 20:43

## 2022-09-12 NOTE — PROGRESS NOTES
1930: TRANSFER - IN REPORT:    Verbal report received from Mg Almaraz RN(name) on Olivia Jennings  being received from [facility] (unit) for routine progression of care      Report consisted of patients Situation, Background, Assessment and   Recommendations(SBAR). Information from the following report(s) SBAR, Kardex, Procedure Summary, Intake/Output, MAR, Accordion, and Recent Results was reviewed with the receiving nurse. Opportunity for questions and clarification was provided. Assessment completed upon patients arrival to unit and care assumed. 1935: Fluids started    2013: Pt transferred to L&D OR2    2041: LTCS infant male taken to warmer. NICU assessing.       2123: Pt transferred back to L&D Rm8

## 2022-09-12 NOTE — PROGRESS NOTES
Antepartum Progress Note    HPI   Faustina Dudley is a 32 y.o.  with IUP at 34w0d, admitted for vaginal bleeding in the setting of placenta previa. Her pregnancy is also complicated by anxiety and rubella non-immune status. Doing well, no acute events overnight. She continues to have some darker spotting, but she denies any heavy vaginal bleeding. She denies any contractions, abnormal discharge or leakage of fluid. She notes good fetal movement. She denies any headaches, vision changes, RUQ pain, chest pain, or SOB. Objective:  Temp:  [97.3 °F (36.3 °C)-99.1 °F (37.3 °C)]   Pulse (Heart Rate):  []   BP: (100-117)/(66-81)   Resp Rate:  [14-18]   O2 Sat (%):  [96 %-99 %]   Weight: --    Physical Exam:    General appearance: Alert, well appearing, in no distress. CVS exam: Regular rhythm, normal rate. Chest: Clear to auscultation bilaterally. Abdominal exam: Soft, gravid, non-tender, non-distended. NST pending      Assessment:  32 y.o.     IUP at 34w0d  Vaginal bleed #2 in setting of placenta previa          -Continued spotting          -s/p BMZ x 2, rBMZ x 2          -s/p NICU, MFM consults    Plan:  BID NSTs  Active T&S q72 hours  Keep large bore IV in place  Sees MFM tomorrow  Per MFM, needs to be completely free of bleeding for 7 days in order to consider possible discharge. Pt is aware of likely potential long-term stay until delivery. Goal for delivery is between 36-37 weeks. Currently trying to plan for 22, at 36w4d.   May have wheelchair privileges to leave unit to go to [facility]      Romayne Beams, MD  2022  8:31 AM

## 2022-09-12 NOTE — PROGRESS NOTES
Problem: Antepartum: Day 1 through Discharge  Goal: Activity/Safety  Outcome: Progressing Towards Goal  Goal: Nutrition/Diet  Outcome: Progressing Towards Goal     Problem: Pain  Goal: *Control of Pain  Outcome: Progressing Towards Goal

## 2022-09-12 NOTE — PROGRESS NOTES
Bedside and Verbal shift change report given to Yamilet Britt RN and [facility] (oncoming nurse) by Rachel Hill RN (offgoing nurse). Report included the following information SBAR, Kardex, Intake/Output, and MAR.

## 2022-09-12 NOTE — PROGRESS NOTES
Spiritual Care Partner Volunteer visited patient at [facility] [address] in [address] [place] [address] on 9/12/2022   Documented by:  Allyson Martinez  [phone]

## 2022-09-13 LAB
ABO + RH BLD: NORMAL
BASOPHILS # BLD: 0 K/UL (ref 0–0.1)
BASOPHILS NFR BLD: 0 % (ref 0–1)
BLD PROD TYP BPU: NORMAL
BLD PROD TYP BPU: NORMAL
BLOOD GROUP ANTIBODIES SERPL: NORMAL
BPU ID: NORMAL
BPU ID: NORMAL
CROSSMATCH RESULT,%XM: NORMAL
CROSSMATCH RESULT,%XM: NORMAL
DIFFERENTIAL METHOD BLD: ABNORMAL
EOSINOPHIL # BLD: 0 K/UL (ref 0–0.4)
EOSINOPHIL NFR BLD: 0 % (ref 0–7)
ERYTHROCYTE [DISTWIDTH] IN BLOOD BY AUTOMATED COUNT: 12.7 % (ref 11.5–14.5)
HCT VFR BLD AUTO: 28.7 % (ref 35–47)
HGB BLD-MCNC: 9.6 G/DL (ref 11.5–16)
IMM GRANULOCYTES # BLD AUTO: 0.2 K/UL (ref 0–0.04)
IMM GRANULOCYTES NFR BLD AUTO: 1 % (ref 0–0.5)
LYMPHOCYTES # BLD: 1.8 K/UL (ref 0.8–3.5)
LYMPHOCYTES NFR BLD: 9 % (ref 12–49)
MCH RBC QN AUTO: 30.7 PG (ref 26–34)
MCHC RBC AUTO-ENTMCNC: 33.4 G/DL (ref 30–36.5)
MCV RBC AUTO: 91.7 FL (ref 80–99)
MONOCYTES # BLD: 1.4 K/UL (ref 0–1)
MONOCYTES NFR BLD: 7 % (ref 5–13)
NEUTS SEG # BLD: 16.3 K/UL (ref 1.8–8)
NEUTS SEG NFR BLD: 83 % (ref 32–75)
NRBC # BLD: 0 K/UL (ref 0–0.01)
NRBC BLD-RTO: 0 PER 100 WBC
PLATELET # BLD AUTO: 293 K/UL (ref 150–400)
PMV BLD AUTO: 9.7 FL (ref 8.9–12.9)
RBC # BLD AUTO: 3.13 M/UL (ref 3.8–5.2)
SPECIMEN EXP DATE BLD: NORMAL
STATUS OF UNIT,%ST: NORMAL
STATUS OF UNIT,%ST: NORMAL
UNIT DIVISION, %UDIV: 0
UNIT DIVISION, %UDIV: 0
WBC # BLD AUTO: 19.7 K/UL (ref 3.6–11)

## 2022-09-13 PROCEDURE — 75410000003 HC RECOV DEL/VAG/CSECN EA 0.5 HR

## 2022-09-13 PROCEDURE — 36415 COLL VENOUS BLD VENIPUNCTURE: CPT

## 2022-09-13 PROCEDURE — 74011250637 HC RX REV CODE- 250/637: Performed by: OBSTETRICS & GYNECOLOGY

## 2022-09-13 PROCEDURE — 85025 COMPLETE CBC W/AUTO DIFF WBC: CPT

## 2022-09-13 PROCEDURE — 65410000002 HC RM PRIVATE OB

## 2022-09-13 PROCEDURE — 74011250636 HC RX REV CODE- 250/636: Performed by: STUDENT IN AN ORGANIZED HEALTH CARE EDUCATION/TRAINING PROGRAM

## 2022-09-13 PROCEDURE — 51798 US URINE CAPACITY MEASURE: CPT

## 2022-09-13 RX ORDER — OXYTOCIN/RINGER'S LACTATE 30/500 ML
10 PLASTIC BAG, INJECTION (ML) INTRAVENOUS AS NEEDED
Status: DISCONTINUED | OUTPATIENT
Start: 2022-09-13 | End: 2022-09-15 | Stop reason: HOSPADM

## 2022-09-13 RX ORDER — ACETAMINOPHEN 325 MG/1
650 TABLET ORAL
Status: DISCONTINUED | OUTPATIENT
Start: 2022-09-13 | End: 2022-09-15 | Stop reason: HOSPADM

## 2022-09-13 RX ORDER — MORPHINE SULFATE 10 MG/ML
6 INJECTION, SOLUTION INTRAMUSCULAR; INTRAVENOUS
Status: DISCONTINUED | OUTPATIENT
Start: 2022-09-13 | End: 2022-09-15 | Stop reason: HOSPADM

## 2022-09-13 RX ORDER — IBUPROFEN 400 MG/1
800 TABLET ORAL EVERY 8 HOURS
Status: DISCONTINUED | OUTPATIENT
Start: 2022-09-13 | End: 2022-09-13

## 2022-09-13 RX ORDER — HYDROCORTISONE 1 %
CREAM (GRAM) TOPICAL AS NEEDED
Status: DISCONTINUED | OUTPATIENT
Start: 2022-09-13 | End: 2022-09-15 | Stop reason: HOSPADM

## 2022-09-13 RX ORDER — SODIUM CHLORIDE 0.9 % (FLUSH) 0.9 %
5-40 SYRINGE (ML) INJECTION EVERY 8 HOURS
Status: DISCONTINUED | OUTPATIENT
Start: 2022-09-13 | End: 2022-09-15 | Stop reason: HOSPADM

## 2022-09-13 RX ORDER — KETOROLAC TROMETHAMINE 30 MG/ML
30 INJECTION, SOLUTION INTRAMUSCULAR; INTRAVENOUS
Status: DISPENSED | OUTPATIENT
Start: 2022-09-13 | End: 2022-09-14

## 2022-09-13 RX ORDER — DOCUSATE SODIUM 100 MG/1
100 CAPSULE, LIQUID FILLED ORAL
Status: DISCONTINUED | OUTPATIENT
Start: 2022-09-13 | End: 2022-09-15 | Stop reason: HOSPADM

## 2022-09-13 RX ORDER — DIPHENHYDRAMINE HYDROCHLORIDE 50 MG/ML
12.5 INJECTION, SOLUTION INTRAMUSCULAR; INTRAVENOUS
Status: DISCONTINUED | OUTPATIENT
Start: 2022-09-13 | End: 2022-09-15 | Stop reason: HOSPADM

## 2022-09-13 RX ORDER — SODIUM CHLORIDE 0.9 % (FLUSH) 0.9 %
5-40 SYRINGE (ML) INJECTION AS NEEDED
Status: DISCONTINUED | OUTPATIENT
Start: 2022-09-13 | End: 2022-09-15 | Stop reason: HOSPADM

## 2022-09-13 RX ORDER — ONDANSETRON 2 MG/ML
4 INJECTION INTRAMUSCULAR; INTRAVENOUS
Status: DISCONTINUED | OUTPATIENT
Start: 2022-09-13 | End: 2022-09-15 | Stop reason: HOSPADM

## 2022-09-13 RX ORDER — HYDROCODONE BITARTRATE AND ACETAMINOPHEN 7.5; 325 MG/1; MG/1
1 TABLET ORAL
Status: DISCONTINUED | OUTPATIENT
Start: 2022-09-13 | End: 2022-09-15 | Stop reason: HOSPADM

## 2022-09-13 RX ORDER — OXYTOCIN/RINGER'S LACTATE 30/500 ML
87.3 PLASTIC BAG, INJECTION (ML) INTRAVENOUS AS NEEDED
Status: DISCONTINUED | OUTPATIENT
Start: 2022-09-13 | End: 2022-09-15 | Stop reason: HOSPADM

## 2022-09-13 RX ORDER — NALOXONE HYDROCHLORIDE 0.4 MG/ML
0.4 INJECTION, SOLUTION INTRAMUSCULAR; INTRAVENOUS; SUBCUTANEOUS AS NEEDED
Status: DISCONTINUED | OUTPATIENT
Start: 2022-09-13 | End: 2022-09-15 | Stop reason: HOSPADM

## 2022-09-13 RX ORDER — HYDROCODONE BITARTRATE AND ACETAMINOPHEN 5; 325 MG/1; MG/1
1 TABLET ORAL
Status: DISCONTINUED | OUTPATIENT
Start: 2022-09-13 | End: 2022-09-15 | Stop reason: HOSPADM

## 2022-09-13 RX ORDER — SODIUM CHLORIDE, SODIUM LACTATE, POTASSIUM CHLORIDE, CALCIUM CHLORIDE 600; 310; 30; 20 MG/100ML; MG/100ML; MG/100ML; MG/100ML
125 INJECTION, SOLUTION INTRAVENOUS CONTINUOUS
Status: DISCONTINUED | OUTPATIENT
Start: 2022-09-13 | End: 2022-09-15 | Stop reason: HOSPADM

## 2022-09-13 RX ORDER — SIMETHICONE 80 MG
80 TABLET,CHEWABLE ORAL
Status: DISCONTINUED | OUTPATIENT
Start: 2022-09-13 | End: 2022-09-15 | Stop reason: HOSPADM

## 2022-09-13 RX ORDER — IBUPROFEN 400 MG/1
800 TABLET ORAL EVERY 8 HOURS
Status: DISCONTINUED | OUTPATIENT
Start: 2022-09-13 | End: 2022-09-15 | Stop reason: HOSPADM

## 2022-09-13 RX ORDER — AMMONIA 15 % (W/V)
1 AMPUL (EA) INHALATION AS NEEDED
Status: DISCONTINUED | OUTPATIENT
Start: 2022-09-13 | End: 2022-09-15 | Stop reason: HOSPADM

## 2022-09-13 RX ORDER — MORPHINE SULFATE 10 MG/ML
10 INJECTION, SOLUTION INTRAMUSCULAR; INTRAVENOUS
Status: DISCONTINUED | OUTPATIENT
Start: 2022-09-13 | End: 2022-09-15 | Stop reason: HOSPADM

## 2022-09-13 RX ADMIN — Medication 1 TABLET: at 09:49

## 2022-09-13 RX ADMIN — IBUPROFEN 800 MG: 400 TABLET, FILM COATED ORAL at 19:18

## 2022-09-13 RX ADMIN — SERTRALINE 50 MG: 50 TABLET, FILM COATED ORAL at 09:49

## 2022-09-13 RX ADMIN — DOCUSATE SODIUM 100 MG: 100 CAPSULE, LIQUID FILLED ORAL at 09:49

## 2022-09-13 RX ADMIN — DOCUSATE SODIUM 100 MG: 100 CAPSULE, LIQUID FILLED ORAL at 17:03

## 2022-09-13 RX ADMIN — KETOROLAC TROMETHAMINE 30 MG: 30 INJECTION, SOLUTION INTRAMUSCULAR; INTRAVENOUS at 04:40

## 2022-09-13 NOTE — ANESTHESIA PREPROCEDURE EVALUATION
Relevant Problems   No relevant active problems       Anesthetic History   No history of anesthetic complications            Review of Systems / Medical History  Patient summary reviewed, nursing notes reviewed and pertinent labs reviewed    Pulmonary  Within defined limits                 Neuro/Psych   Within defined limits           Cardiovascular  Within defined limits                     GI/Hepatic/Renal  Within defined limits              Endo/Other  Within defined limits           Other Findings              Physical Exam    Airway  Mallampati: II  TM Distance: 4 - 6 cm  Neck ROM: normal range of motion   Mouth opening: Normal     Cardiovascular    Rhythm: regular  Rate: normal         Dental  No notable dental hx       Pulmonary  Breath sounds clear to auscultation               Abdominal  GI exam deferred       Other Findings            Anesthetic Plan    ASA: 2, emergent  Anesthesia type: spinal      Post-op pain plan if not by surgeon: intrathecal opiates      Anesthetic plan and risks discussed with: Patient, Mother and Spouse      Will give sodium bicitrate for eating 2 hours ago

## 2022-09-13 NOTE — PROGRESS NOTES
TRANSFER - IN REPORT:    Verbal report received from NOREEN Bower(name) on Jair Kim  being received from L&D(unit) for routine post - op      Report consisted of patients Situation, Background, Assessment and   Recommendations(SBAR). Information from the following report(s) SBAR, Kardex, ED Summary, OR Summary, Procedure Summary, Intake/Output, MAR, Accordion, Recent Results, and Med Rec Status was reviewed with the receiving nurse. Opportunity for questions and clarification was provided. Assessment completed upon patients arrival to unit and care assumed.

## 2022-09-13 NOTE — PROGRESS NOTES
High Risk Obstetrics Progress Note    Name: Pepe Butt MRN: [id number]  SSN: [id number]    YOB: 1990  Age: 32 y.o. Sex: female      Subjective:      LOS: 7 days    Estimated Date of Delivery: 10/24/22   Gestational Age Today: 34w0d     Patient admitted for vaginal bleeding. States she does have severe vaginal bleeding. Objective:     Vitals:  Blood pressure 119/85, pulse 95, temperature 98.4 °F (36.9 °C), resp. rate 16, height 5' 11\" (1.803 m), weight 82.1 kg (181 lb), SpO2 97 %, not currently breastfeeding. Temp (24hrs), Av.2 °F (36.8 °C), Min:98 °F (36.7 °C), Max:98.4 °F (74.6 °C)    Systolic (74UIS), PMZ:999 , Min:101 , QFD:133      Diastolic (29KPQ), LANG:53, Min:67, Max:93       Intake and Output:     Date 22 0700 - 22 0659   Shift 2862-1312 0630-9123 3364-1031 24 Hour Total   INTAKE   P.O. 750   750   Shift Total(mL/kg) 750(9.1)   750(9.1)   OUTPUT   Urine(mL/kg/hr) 1(0) 1  2   Shift Total(mL/kg) 1(0) 1(0)  2(0)   Weight (kg) 82.1 82.1 82.1 82.1       Physical Exam:  Perineum: blood present, amniotic fluid absent       Membranes:  Intact    Uterine Activity:  Frequency: Every 5 minutes   Duration: 30 seconds  Intensity: mild    Fetal Heart Rate:  Baseline: 130 per minute  Variability: moderate  Accelerations: yes  Decelerations: none  Uterine contractions: irregular, every 5-7   minutes        Labs:   Recent Results (from the past 36 hour(s))   RBC, ALLOCATE    Collection Time: 22  7:45 PM   Result Value Ref Range    HISTORY CHECKED? Historical check performed        Assessment and Plan: Active Problems:    Placenta previa antepartum in third trimester (2022)      Placenta previa antepartum (2022)       Hemorrhage:  3rd bleeding episode with complete previa and significant hemorrhage will proceed to OR for delivery Now.     Signed By: Khang Crawford MD     2022

## 2022-09-13 NOTE — ANESTHESIA POSTPROCEDURE EVALUATION
Procedure(s):   SECTION. spinal    Anesthesia Post Evaluation      Multimodal analgesia: multimodal analgesia used between 6 hours prior to anesthesia start to PACU discharge  Patient location during evaluation: bedside  Patient participation: complete - patient participated  Level of consciousness: awake  Pain score: 0  Pain management: satisfactory to patient  Airway patency: patent  Anesthetic complications: no  Cardiovascular status: acceptable and blood pressure returned to baseline  Respiratory status: acceptable  Hydration status: acceptable  Comments: I have evaluated the patient and meets criteria for discharge from PACU. Ela Clarke DO.   Post anesthesia nausea and vomiting:  none  Final Post Anesthesia Temperature Assessment:  Normothermia (36.0-37.5 degrees C)      INITIAL Post-op Vital signs:   Vitals Value Taken Time   /74 22 0205   Temp 36.6 °C (97.8 °F) 225   Pulse 79 22 0205   Resp 16 22 020   SpO2 96 % 22

## 2022-09-13 NOTE — OP NOTES
Operative Note    Patient: Mariah Calzada  YOB: 1990  MRN: [id number]    Date of Procedure: 2022     Pre-Op Diagnosis: c/s placenta previa/ [facility] 10/24/22    Post-Op Diagnosis:  placental abruption and mildly adherent placenta       Procedure(s):   SECTION    Surgeon(s):  Samantha Crum MD    Surgical Assistant: Surg Asst-1: Kathleen Kerr RN    Anesthesia: Epidural     Estimated Blood Loss (mL):  see QBL    Complications: Bleeding and Other: placental abruption    Specimens:   ID Type Source Tests Collected by Time Destination   1 : Rocky Art Placenta Fresh Uterus  Samantha Crum MD 2022 Pathology        Implants: * No implants in log *    Drains: * No LDAs found *    Findings: Viable infant    Detailed Description of Procedure:    Operative Note    Name: Sawyer [employer]" Record Number: [id number]   YOB: 1990  Today's Date: 2022      Pre-operative Diagnosis: c/s placenta previa/ [facility] 10/24/22    Post-operative Diagnosis: * No post-op diagnosis entered *    Operation: low transverse  section Procedure(s):   SECTION    Surgeon(s):  Samantha Crum MD    Anesthesia: Epidural    Prophylactic Antibiotics: Ancef  DVT Prophylaxis: Sequential Compression Devices         Fetal Description: [place]     Birth Information:   Information for the patient's :  Denise Yeung [[id number]]   Delivery of a   male infant on 2022 at 8:41 PM. Apgars were   and  . Umbilical Cord: 3 Vessels     Umbilical Cord Events: None     Placenta: Manual Removal removal with   appearance.     Amniotic Fluid Volume:        Amniotic Fluid Description:           Placenta:  spontaneous Expressed    Estimated Blood Loss (ml):  see QBL    Specimens: Placenta           Complications:  placenta abruption    Procedure Detail:      After proper patient identification and consent, the patient was taken to the operating room, where epidural anesthesia was administered and found to be adequate. Montiel catheter had been placed using sterile technique. The patient was prepped and draped in the normal sterile fashion. The abdomen was entered using the Pfannenstiel technique. The peritoneum was entered sharply well superior to the bladder without any apparent injury. The bladder flap was created. A low transverse uterine incision was made with the scalpel  and extended with blunt finger dissection. Amniotomy was performed and the fluid was medium amount clear. The babys head was then delivered atraumatically. The cord was clamped and cut and the baby was handed off to  staff in attendance. Placenta was then removed from the uterus. The uterus was curettaged with a moist lap pad and cleared of all clots and debris. The uterine incision was closed with 0 vicryl, double layer  in running locking fashion with good hemostasis assured. Good hemostasis was again reassured. The fascia was closed with 0 Vicryl in a running fashion. Good hemostasis was assured. The skin was closed with a 3-0 vicryl subcuticular closure. The patient tolerated the procedure well. Sponge, lap, and needle counts were correct times three and the patient and baby were taken to recovery/postpartum room in stable condition.     Fer Abrams MD  2022  9:25 PM                      Electronically Signed by Fer Abrams MD on 2022 at 9:23 PM

## 2022-09-13 NOTE — PROGRESS NOTES
Verbal and Bedside shift change report given to 2825 Lissa Hernandez (oncoming nurse) by Sandra Villanueva RN (offgoing nurse). Report included the following information SBAR, Kardex, Intake/Output, MAR, and Recent Results.

## 2022-09-13 NOTE — PROGRESS NOTES
At approximately [address] PCT called RN writer to check on pt,Upon arrival to pt's rm, pt was sitting on the commode w/ Kirsten SORTO. beside her in the rest room. Verbalized that it feels like blood is coming out and like I am peeing as described by pt. As soon as that episode stopped and had scanty bleeding on toilet paper when pt wiped, pt assisted back to bed, rechecked her pad there's small of blood on the new pad. Pt connected to monitor, no contraction felt by pt. Dr Kiya Strong called by Adrian Crystal RN. Dr Kiya Strong arrived swiftly @ approx few minutes thereafter. He spoke to pt and , and Dr saw the blood in the commode. He discussed plan of care with pt and . Pt anxious teary, emotional support provided. @  approx 1935 Pt transported to L&D via bed. Alert, conscious, denies pain, or contraction,  present. TRANSFER - OUT REPORT:    @ 80 Verbal report given to Isauro Verduzco RN(name) on Jameson Figueroa  being transferred to L&D(unit) for change in patient condition( episode of vaginal bleeding when she went to the bathrrom(Pt complete previa))       Report consisted of patients Situation, Background, Assessment and   Recommendations(SBAR). Information from the following report(s) SBAR, Kardex, Intake/Output, MAR, and Recent Results was reviewed with the receiving nurse. Lines:   Peripheral IV 09/05/22 Anterior; Left Forearm (Active)   Site Assessment Clean, dry, & intact 09/12/22 1118   Phlebitis Assessment 0 09/12/22 1118   Infiltration Assessment 0 09/12/22 1118   Dressing Status Clean, dry, & intact 09/12/22 1118   Dressing Type Transparent 09/12/22 1118   Hub Color/Line Status Capped 09/12/22 1118   Action Taken Open ports on tubing capped 09/12/22 1118   Alcohol Cap Used Yes 09/12/22 1118       Peripheral IV 09/12/22 Right Wrist (Active)        Opportunity for questions and clarification was provided.       Patient transported with:   Registered Nurse NOREEN Banda and Chanda Altman AD SORTO

## 2022-09-13 NOTE — PROGRESS NOTES
Anesthesia Post Operative Day 1      The patient is status post C Section with spinal  anesthesia and Duramorph for pain. The patient relates the following scales: pain,none ; itching, none ; nausea, mild. All sympyoms were treated with medications per protocol. The patient is up and ambulating and has minimal complaints. Plan: Continue to treat breakthrough symptoms as needed with protocol medictions.

## 2022-09-13 NOTE — ANESTHESIA PROCEDURE NOTES
Spinal Block    Start time: 9/12/2022 8:17 PM  End time: 9/12/2022 8:19 PM  Performed by: Nick Silva DO  Authorized by: Nick Silva DO     Pre-procedure:   Indications: primary anesthetic  Preanesthetic Checklist: patient identified, risks and benefits discussed, anesthesia consent, site marked, patient being monitored, timeout performed and fire risk safety assessment completed and verbalized      Spinal Block:   Patient Position:  Seated  Prep Region:  Lumbar  Prep: chlorhexidine and patient draped      Location:  L3-4  Technique:  Single shot  Local: morphine (PF) 1 mg/mL Intrathecal - Intrathecal   0.25 mg - 9/12/2022 8:19:00 PM  fentaNYL citrate (PF) Intrathecal - Intrathecal   15 mcg - 9/12/2022 8:19:00 PM  bupivacaine (PF) (MARCAINE) 0.75 % (7.5 mg/mL) Intrathecal - Intrathecal   12 mg - 9/12/2022 8:19:00 PM  Local Dose (mL):  1.6  Med Admin Time: 9/12/2022 8:19 PM    Needle:   Needle Type:  Pencil-tip  Needle Gauge:  25 G  Attempts:  1      Events: CSF confirmed, no blood with aspiration and no paresthesia        Assessment:  Insertion:  Uncomplicated  Patient tolerance:  Patient tolerated the procedure well with no immediate complications

## 2022-09-13 NOTE — PROGRESS NOTES
Post-Operative  Day 1    Jennifer Cunningham is a 32 y.o.  who is POD 1 s/p emergent primary low transverse  section at 34w0d, secondary to acute hemorrhage in the setting of placenta previa. Patient doing well without unusual complaints this morning. Tolerating liquids, no flatus. Montiel was removed 3 hours ago, hasn't been able to void yet. Bleeding is appropriate, pain well controlled. Baby boy in NICU, reportedly doing well. Vitals:  Visit Vitals  /73 (BP 1 Location: Left upper arm, BP Patient Position: At rest)   Pulse 90   Temp 98.1 °F (36.7 °C)   Resp 16   Ht 5' 11\" (1.803 m)   Wt 82.1 kg (181 lb)   SpO2 98%   Breastfeeding Unknown   BMI 25.24 kg/m²     Temp (24hrs), Av.1 °F (36.7 °C), Min:97.8 °F (36.6 °C), Max:98.4 °F (36.9 °C)      Last 24hr Input/Output:    Intake/Output Summary (Last 24 hours) at 2022 0835  Last data filed at 2022 0440  Gross per 24 hour   Intake 750 ml   Output 2307 ml   Net -1557 ml          Exam:       Patient without distress. Abdomen:soft, expected tenderness, fundus firm, dermabond over incision with no areas of drainage, erythema, or warmth     Lower extremities are nontender without edema. No cords    Labs:   Lab Results   Component Value Date/Time    WBC 19.7 (H) 2022 06:29 AM    WBC 10.1 2022 06:32 AM    WBC 9.6 2022 10:00 AM    HGB 9.6 (L) 2022 06:29 AM    HGB 12.0 2022 06:32 AM    HGB 11.7 2022 10:00 AM    HCT 28.7 (L) 2022 06:29 AM    HCT 35.1 2022 06:32 AM    HCT 34.5 (L) 2022 10:00 AM    PLATELET 327  06:29 AM    PLATELET 893 6201 06:32 AM    PLATELET 428  10:00 AM       Recent Results (from the past 24 hour(s))   RBC, ALLOCATE    Collection Time: 22  7:45 PM   Result Value Ref Range    HISTORY CHECKED?  Historical check performed    CBC WITH AUTOMATED DIFF    Collection Time: 22  6:29 AM   Result Value Ref Range    WBC 19.7 (H) 3.6 - 11.0 K/uL    RBC 3.13 (L) 3.80 - 5.20 M/uL    HGB 9.6 (L) 11.5 - 16.0 g/dL    HCT 28.7 (L) 35.0 - 47.0 %    MCV 91.7 80.0 - 99.0 FL    MCH 30.7 26.0 - 34.0 PG    MCHC 33.4 30.0 - 36.5 g/dL    RDW 12.7 11.5 - 14.5 %    PLATELET 680 076 - 133 K/uL    MPV 9.7 8.9 - 12.9 FL    NRBC 0.0 0  WBC    ABSOLUTE NRBC 0.00 0.00 - 0.01 K/uL    NEUTROPHILS 83 (H) 32 - 75 %    LYMPHOCYTES 9 (L) 12 - 49 %    MONOCYTES 7 5 - 13 %    EOSINOPHILS 0 0 - 7 %    BASOPHILS 0 0 - 1 %    IMMATURE GRANULOCYTES 1 (H) 0.0 - 0.5 %    ABS. NEUTROPHILS 16.3 (H) 1.8 - 8.0 K/UL    ABS. LYMPHOCYTES 1.8 0.8 - 3.5 K/UL    ABS. MONOCYTES 1.4 (H) 0.0 - 1.0 K/UL    ABS. EOSINOPHILS 0.0 0.0 - 0.4 K/UL    ABS. BASOPHILS 0.0 0.0 - 0.1 K/UL    ABS. IMM. GRANS. 0.2 (H) 0.00 - 0.04 K/UL    DF AUTOMATED             Assessment: Post-Op day 1, stable  Acute blood loss anemia: post op Hgb 9.6, appropriate decrease from 12.0  Rubella non immune  Baby boy, in NICU    Plan:   1. Routine post-operative care   2. Monitor for spontaneous void   3. Will need MMR prior to discharge   4.  Possible circ prior to baby's discharge if able      Issac Tang MD  9/13/2022

## 2022-09-13 NOTE — BRIEF OP NOTE
Brief Postoperative Note    Patient: Kelly Bryan  YOB: 1990  MRN: [id number]    Date of Procedure: 2022     Pre-Op Diagnosis: c/s placenta previa/ [facility] 10/24/22    Post-Op Diagnosis: Same as preoperative diagnosis. and Placental Abruption       Procedure(s):   SECTION    Surgeon(s):  Sanjeev Salazar MD    Surgical Assistant: Surg Asst-1: Daniel Matthews RN    Anesthesia: Epidural     Estimated Blood Loss (mL): QBL    Complications: Other: adherent placenta    Specimens:   ID Type Source Tests Collected by Time Destination   1 : Sharad Flores Placenta Fresh Uterus  Sanjeev Salazar MD 2022 Pathology        Implants: * No implants in log *    Drains: * No LDAs found *    Findings: acute hemorrhage c/w placental abruption.  Large clot in placenta and adherent placenta (near cervix)    Electronically Signed by Harry Reddy MD on 2022 at 9:20 PM

## 2022-09-13 NOTE — PROGRESS NOTES
Bedside and Verbal shift change report given to ROSELINE Mcdonald (oncoming nurse) by Lisa Jaime. Adelaida Jin RN (offgoing nurse). Report included the following information SBAR, Kardex, ED Summary, OR Summary, Procedure Summary, Intake/Output, MAR, Accordion, and Recent Results.

## 2022-09-13 NOTE — LACTATION NOTE
Initial Lactation Consultation -  patient delivered by  yesterday at 34 weeks. Patient noticed breast changes during her pregnancy and has no medical  history negatively affecting her milk production. I helped mom with hand expression and we were able to express colostrum from each breast. Parents know to collect the milk and then label with the date and time before taking to the NICU for the baby. Infant admitted to NICU. Pt will successfully establish breast milk supply by pumping with a hospital grade pump every 2-3 hours for approximately 20 minutes/8-10 x day with the correct size flange, and suction level for mother's comfort. To maximize milk production, mom taught to incorporate breast massage and hand expression into pumping sessions. All expressed breast milk (EBM) will be provided for infant use, in clean bottles/syringes for storage in NICU breastmilk refrigerator. Patient label with barcode,date and time applied to each container prior to transport to NICU. Proper cleaning of pump parts and good hand hygiene discussed. Mother is advised to rent a hospital grade pump to continue regimen at home. Progress of milk transition, pumping log, expected EBM volumes, care of engorged breasts discussed. The value of bonding with baby emphasized, strategies for participating in infant care, photos, footprints, touch, and holding skin to skin as soon as baby and mother are able have been shown to increase oxytocin levels. The breast will be offered as baby is ready; with the goal of eventual transition to breastfeeding. Gus Whitley

## 2022-09-14 PROCEDURE — 74011250637 HC RX REV CODE- 250/637: Performed by: OBSTETRICS & GYNECOLOGY

## 2022-09-14 PROCEDURE — 65410000002 HC RM PRIVATE OB

## 2022-09-14 PROCEDURE — 74011250637 HC RX REV CODE- 250/637: Performed by: STUDENT IN AN ORGANIZED HEALTH CARE EDUCATION/TRAINING PROGRAM

## 2022-09-14 RX ORDER — POLYETHYLENE GLYCOL 3350 17 G/17G
17 POWDER, FOR SOLUTION ORAL DAILY PRN
Status: DISCONTINUED | OUTPATIENT
Start: 2022-09-14 | End: 2022-09-15 | Stop reason: HOSPADM

## 2022-09-14 RX ORDER — TRISODIUM CITRATE DIHYDRATE AND CITRIC ACID MONOHYDRATE 500; 334 MG/5ML; MG/5ML
SOLUTION ORAL AS NEEDED
Status: DISCONTINUED | OUTPATIENT
Start: 2022-09-12 | End: 2022-09-14 | Stop reason: HOSPADM

## 2022-09-14 RX ADMIN — HYDROCODONE BITARTRATE AND ACETAMINOPHEN 1 TABLET: 5; 325 TABLET ORAL at 08:53

## 2022-09-14 RX ADMIN — POLYETHYLENE GLYCOL 3350 17 G: 17 POWDER, FOR SOLUTION ORAL at 08:53

## 2022-09-14 RX ADMIN — IBUPROFEN 800 MG: 400 TABLET, FILM COATED ORAL at 02:28

## 2022-09-14 RX ADMIN — IBUPROFEN 800 MG: 400 TABLET, FILM COATED ORAL at 21:08

## 2022-09-14 RX ADMIN — IBUPROFEN 800 MG: 400 TABLET, FILM COATED ORAL at 11:54

## 2022-09-14 RX ADMIN — DOCUSATE SODIUM 100 MG: 100 CAPSULE, LIQUID FILLED ORAL at 08:53

## 2022-09-14 RX ADMIN — HYDROCODONE BITARTRATE AND ACETAMINOPHEN 1 TABLET: 5; 325 TABLET ORAL at 15:46

## 2022-09-14 RX ADMIN — Medication 1 TABLET: at 08:53

## 2022-09-14 RX ADMIN — SERTRALINE 50 MG: 50 TABLET, FILM COATED ORAL at 08:53

## 2022-09-14 RX ADMIN — HYDROCODONE BITARTRATE AND ACETAMINOPHEN 1 TABLET: 7.5; 325 TABLET ORAL at 21:11

## 2022-09-14 NOTE — PROGRESS NOTES
Bedside and Verbal shift change report given to [employer] (oncoming nurse) by Heather Garcia (offgoing nurse). Report included the following information SBAR, Kardex, Intake/Output, MAR, Accordion, and Recent Results. TRANSFER - OUT REPORT:    Verbal report given to keya (name) on Neto Villeda  being transferred to MIU(unit) for routine progression of care       Report consisted of patients Situation, Background, Assessment and   Recommendations(SBAR). Information from the following report(s) SBAR, Procedure Summary, Intake/Output, MAR, Accordion, and Recent Results was reviewed with the receiving nurse. Lines:       Opportunity for questions and clarification was provided.       Patient transported with:   Registered Nurse

## 2022-09-14 NOTE — PROGRESS NOTES
TRANSFER - IN REPORT:    Verbal report received from BALWINDER Mensah RN (name) on Keya Chacko  being received from L&D (unit) for routine progression of care      Report consisted of patients Situation, Background, Assessment and   Recommendations(SBAR). Information from the following report(s) SBAR was reviewed with the receiving nurse. Opportunity for questions and clarification was provided. Assessment completed upon patients arrival to unit and care assumed.

## 2022-09-14 NOTE — LACTATION NOTE
This note was copied from a baby's chart. Asked to assist mom put the baby to the breast for the first time. Baby sucking on a pacifier and on my finger. We put baby on mom in the cross cradle hold. Baby not showing feeding cues. I gave mom a nipple shield and showed her how to use it. We were able to get baby latched to the shield. We used some donor milk at the breast to get baby sucking. He nursed approximately for 2 minutes before stopping. Mom will continue to offer the breast as allowed by doctors. She will continue to pump at least every 3 hours.

## 2022-09-14 NOTE — PROGRESS NOTES
Post-Operative  Day 2    Neto Villeda is a 32 y.o.  who is POD 2 s/p emergent primary low transverse  section at 34w0d, secondary to acute hemorrhage in the setting of placenta previa. Patient doing well without unusual complaints this morning. Ambulating and now voiding without difficulty (did have to have straight cath yesterday, but has been spontaneously voiding on her own since then). Pain well controlled. Tolerating regular diet, passing flatus. Hasn't had a BM in a few days. Baby boy, in NICU, doing well. She is pumping. Vitals:  Visit Vitals  /76 (BP 1 Location: Right upper arm, BP Patient Position: At rest;Lying)   Pulse 95   Temp 97.9 °F (36.6 °C)   Resp 16   Ht 5' 11\" (1.803 m)   Wt 82.1 kg (181 lb)   SpO2 98%   Breastfeeding Unknown   BMI 25.24 kg/m²     Temp (24hrs), Av.4 °F (36.9 °C), Min:97.9 °F (36.6 °C), Max:98.7 °F (37.1 °C)      Last 24hr Input/Output:    Intake/Output Summary (Last 24 hours) at 2022 0817  Last data filed at 2022 1730  Gross per 24 hour   Intake --   Output 850 ml   Net -850 ml            Exam:       Patient without distress. Abdomen:soft, expected tenderness, fundus firm, dermabond over incision with no areas of drainage, erythema, or warmth     Lower extremities are nontender without edema. No cords    Labs:   Lab Results   Component Value Date/Time    WBC 19.7 (H) 2022 06:29 AM    WBC 10.1 2022 06:32 AM    WBC 9.6 2022 10:00 AM    HGB 9.6 (L) 2022 06:29 AM    HGB 12.0 2022 06:32 AM    HGB 11.7 2022 10:00 AM    HCT 28.7 (L) 2022 06:29 AM    HCT 35.1 2022 06:32 AM    HCT 34.5 (L) 2022 10:00 AM    PLATELET 170  06:29 AM    PLATELET 140  06:32 AM    PLATELET 997  10:00 AM       No results found for this or any previous visit (from the past 24 hour(s)).           Assessment: Post-Op day 2, stable  Acute blood loss anemia: post op Hgb 9.6, appropriate decrease from 12.0  Rubella non immune  Baby boy, in NICU    Plan:   1. Routine post-operative care   2. Will need MMR prior to discharge   3. Possible circ prior to baby's discharge if able   4.  Continue stool softener, can give Miralax today to help with constipation      Tennille Perez MD  9/14/2022  8:18 AM

## 2022-09-15 VITALS
SYSTOLIC BLOOD PRESSURE: 123 MMHG | HEART RATE: 90 BPM | TEMPERATURE: 98 F | OXYGEN SATURATION: 100 % | WEIGHT: 181 LBS | RESPIRATION RATE: 16 BRPM | BODY MASS INDEX: 25.34 KG/M2 | DIASTOLIC BLOOD PRESSURE: 89 MMHG | HEIGHT: 71 IN

## 2022-09-15 PROCEDURE — 74011250637 HC RX REV CODE- 250/637: Performed by: OBSTETRICS & GYNECOLOGY

## 2022-09-15 PROCEDURE — 90707 MMR VACCINE SC: CPT | Performed by: STUDENT IN AN ORGANIZED HEALTH CARE EDUCATION/TRAINING PROGRAM

## 2022-09-15 PROCEDURE — 74011250636 HC RX REV CODE- 250/636: Performed by: STUDENT IN AN ORGANIZED HEALTH CARE EDUCATION/TRAINING PROGRAM

## 2022-09-15 RX ORDER — HYDROCODONE BITARTRATE AND ACETAMINOPHEN 5; 325 MG/1; MG/1
1 TABLET ORAL
Qty: 24 TABLET | Refills: 0 | Status: SHIPPED | OUTPATIENT
Start: 2022-09-15 | End: 2022-09-20

## 2022-09-15 RX ORDER — DOCUSATE SODIUM 100 MG/1
100 CAPSULE, LIQUID FILLED ORAL 2 TIMES DAILY
Qty: 60 CAPSULE | Refills: 2 | Status: SHIPPED | OUTPATIENT
Start: 2022-09-15 | End: 2022-12-14

## 2022-09-15 RX ORDER — IBUPROFEN 800 MG/1
800 TABLET ORAL EVERY 8 HOURS
Qty: 30 TABLET | Refills: 0 | Status: SHIPPED | OUTPATIENT
Start: 2022-09-15

## 2022-09-15 RX ADMIN — HYDROCODONE BITARTRATE AND ACETAMINOPHEN 1 TABLET: 5; 325 TABLET ORAL at 05:21

## 2022-09-15 RX ADMIN — MEASLES, MUMPS, AND RUBELLA VIRUS VACCINE LIVE 0.5 ML: 1000; 12500; 1000 INJECTION, POWDER, LYOPHILIZED, FOR SUSPENSION SUBCUTANEOUS at 12:19

## 2022-09-15 RX ADMIN — IBUPROFEN 800 MG: 400 TABLET, FILM COATED ORAL at 05:21

## 2022-09-15 NOTE — DISCHARGE SUMMARY
Obstetrical Discharge Summary     Name: Keanu Matt MRN: [id number]  SSN: [id number]    YOB: 1990  Age: 32 y.o. Sex: female      Allergies: Sulfa (sulfonamide antibiotics)    Admit Date: 2022    Discharge Date: 9/15/2022     Admitting Physician: Luisito Garcia DO     Attending Physician:  Chay Rodriguez MD     * Admission Diagnoses: Placenta previa antepartum in third trimester [O44.03]  Placenta previa antepartum [O44.00]    * Discharge Diagnoses:   Information for the patient's :  Mel Moreira [[id number]]   Delivery of a 2.917 kg male infant via , Low Transverse on 2022 at 8:41 PM  by Margie Levine. Apgars were 9  and 9 . Additional Diagnoses:   Hospital Problems as of 9/15/2022 Never Reviewed            Codes Class Noted - Resolved POA    Placenta previa antepartum in third trimester ICD-10-CM: O44.03  ICD-9-CM: 641.13  2022 - Present Unknown        Placenta previa antepartum ICD-10-CM: O44.00  ICD-9-CM: 641.13  2022 - Present Unknown          Lab Results   Component Value Date/Time    ABO/Rh(D) A POSITIVE 2022 12:11 AM    Rubella, External Non immune 2022 12:00 AM    ABO,Rh A Positive 2022 12:00 AM    There is no immunization history for the selected administration types on file for this patient. * Procedures: Emergent primary low transverse  section  Procedure(s):   SECTION           * Discharge Condition: stable    * Hospital Course:   Keanu Matt is a 32 y.o. , who was initially admitted at  33 weeks with her second episode of vaginal bleeding in the setting of a known placenta previa. Her pregnancy was managed by Dr. Dangelo Kirkpatrick and was also complicated by anxiety and rubella non immune status. She had previously received BMZ x 2 during her first admission for bleed #1, and she received a rescue course on admission.  She continued to spot daily and remained inpatient for continued monitoring. At 34w0d, she suddenly started bleeding heavily and recommendation was made to proceed with delivery. She delivered via emergent primary low transverse  section, without any complications. Placental abruption was confirmed after delivery of the placenta. She recovered well postoperatively and met all goals for discharge by POD 3. Baby boy doing well in the NICU, likely going to be discharged in the next few days as well. * Disposition: Home    Discharge Medications:   Current Discharge Medication List        START taking these medications    Details   docusate sodium (COLACE) 100 mg capsule Take 1 Capsule by mouth two (2) times a day for 90 days. Qty: 60 Capsule, Refills: 2  Start date: 9/15/2022, End date: 2022      ibuprofen (MOTRIN) 800 mg tablet Take 1 Tablet by mouth every eight (8) hours. Qty: 30 Tablet, Refills: 0  Start date: 9/15/2022      HYDROcodone-acetaminophen (NORCO) 5-325 mg per tablet Take 1 Tablet by mouth every four (4) hours as needed for Pain for up to 5 days. Max Daily Amount: 6 Tablets. Qty: 24 Tablet, Refills: 0  Start date: 9/15/2022, End date: 2022    Associated Diagnoses:  delivery delivered           CONTINUE these medications which have NOT CHANGED    Details   sertraline (ZOLOFT) 25 mg tablet Take 50 mg by mouth daily. * Follow-up Care/Patient Instructions:   Activity: Activity as tolerated, No sex for 6 weeks, No driving while on analgesics, and No heavy lifting for 6 weeks  Diet: Regular Diet  Wound Care: Keep wound clean and dry    Follow-up Information       Follow up With Specialties Details Why Contact Info    Other, None, MD Cardiac Rehabilitation   Patient not available to ask      Iram Fam MD Obstetrics & Gynecology Follow up in 2 week(s)  [address] [facility] [address]  [address]

## 2022-09-15 NOTE — DISCHARGE INSTRUCTIONS
POSTPARTUM DISCHARGE INSTRUCTIONS       Name:  Jennifer Westfall  YOB: 1990  Admission Diagnosis:  Placenta previa antepartum in third trimester [O44.03]  Placenta previa antepartum [O44.00]     Discharge Diagnosis:  [unfilled]  Attending Physician:  [unfilled]    Delivery Type:   Section: What to Expect at Home    Your Recovery:  A  section, or , is surgery to deliver your baby through a cut, called an incision that the doctor makes in your lower belly and uterus. You may have some pain in your lower belly and need pain medicine for 1 to 2 weeks. You can expect some vaginal bleeding for several weeks. You will probably need about 6 weeks to fully recover. It is important to take it easy while the incision is healing. Avoid heavy lifting, strenuous activities, or exercises that strain the belly muscles while you are recovering. Ask a family member or friend for help with housework, cooking, and shopping. This care sheet gives you a general idea about how long it will take for you to recover. But each person recovers at a different pace. Follow the steps below to get better as quickly as possible. How can you care for yourself at home? Activity  Rest when you feel tired. Getting enough sleep will help you recover. Try to walk each day. Start by walking a little more than you did the day before. Bit by bit, increase the amount you walk. Walking boosts blood flow and helps prevent pneumonia, constipation, and blood clots. Avoid strenuous activities, such as bicycle riding, jogging, weightlifting, and aerobic exercise, for 6 weeks or until your doctor says it is okay. Until your doctor says it is okay, do not lift anything heavier than your baby. Do not do sit-ups or other exercise that strain the belly muscles for 6 weeks or until your doctor says it is okay. Hold a pillow over your incision when you cough or take deep breaths.  This will support your belly and decrease your pain. You may shower as usual. Pat the incision dry when you are done. You will have some vaginal bleeding. Wear sanitary pads. Do not douche or use tampons until your doctor says it is okay. Ask your doctor when you can drive again. You will probably need to take at least 6 weeks off work. It depends on the type of work you do and how you feel. Wait until you are healed (about 4 to 6 weeks) before you have sexual intercourse. Your doctor will tell you when it is okay to have sex. Talk to your doctor about birth control. You can get pregnant even before your period returns. Also, you can get pregnant while you are breast-feeding. Incision care  Your skin is your body's first line of defense against germs, but an incision site leaves an easy way for germs to enter your body. To prevent infection:  Clean your hands frequently and before and after changing any touching any dressings. If you have strips of tape on the incision, leave the tape on for a week or until it falls off. Look at your incision closely every day for any changes. Contact your doctor if you experience any signs of infection, such as fever or increased redness at the surgical site. Wash the area daily with warm, soapy water, and pat it dry. Don't use hydrogen peroxide or alcohol, which can slow healing. You may cover the area with a gauze bandage if it weeps or rubs against clothing. Change the bandage every day. Keep the area clean and dry. Diet  You can eat your normal diet. If your stomach is upset, try bland, low-fat foods like plain rice, broiled chicken, toast, and yogurt. Drink plenty of fluids (unless your doctor tells you not to). You may notice that your bowel movements are not regular right after your surgery. This is common. Try to avoid constipation and straining with bowel movements. You may want to take a fiber supplement every day.  If you have not had a bowel movement after a couple of days, ask your doctor about taking a mild laxative. If you are breast-feeding, do not drink any alcohol. Medicines  Take pain medicines exactly as directed. If the doctor gave you a prescription medicine for pain, take it as prescribed. If you are not taking a prescription pain medicine, ask your doctor if you can take an over-the-counter medicine such as acetaminophen (Tylenol), ibuprofen (Advil, Motrin), or naproxen (Aleve), for cramps. Read and follow all instructions on the label. Do not take aspirin, because it can cause more bleeding. Do not take acetaminophen (Tylenol) and other acetaminophen containing medications (i.e. Percocet) at the same time. If you think your pain medicine is making you sick to your stomach: Take your medicine after meals (unless your doctor has told you not to). Ask your doctor for a different pain medicine. If your doctor prescribed antibiotics, take them as directed. Do not stop taking them just because you feel better. You need to take the full course of antibiotics. Mental Health  Many women get the \"baby blues\" during the first few days after childbirth. You may lose sleep, feel irritable, and cry easily. You may feel happy one minute and sad the next. Hormone changes are one cause of these emotional changes. Also, the demands of a new baby, along with visits from relatives or other family needs, add to a mother's stress. The \"baby blues\" often peak around the fourth day. Then they ease up in less than 2 weeks. If your moodiness or anxiety lasts for more than 2 weeks, or if you feel like life is not worth living, you may have postpartum depression. This is different for each mother. Some mothers with serious depression may worry intensely about their infant's well-being. Others may feel distant from their child. Some mothers might even feel that they might harm their baby. A mother may have signs of paranoia, wondering if someone is watching her.       With all the changes in your life, you may not know if you are depressed. Pregnancy sometimes causes changes in how you feel that are similar to the symptoms of depression. Symptoms of depression include:  Feeling sad or hopeless and losing interest in daily activities. These are the most common symptoms of depression. Sleeping too much or not enough. Feeling tired. You may feel as if you have no energy. Eating too much or too little. [employer]PART[employer] (PSI) offers a Warm line; Chat with the Expert phone sessions; Information and Articles about Pregnancy and Postpartum Mood Disorders; Comprehensive List of Free Support Groups; Knowledgeable local coordinators who will offer support, information, and resources; Guide to Resources on [employer]; Calendar of events in the  mood disorders community; Latest News and Research; and [facility] [address] for [employer]. Remember - You are not alone; You are not to blame; With help, you will be well. 6-833-303-PPD(0873). WWW. POSTPARTUM. NET   Writing or talking about death, such as writing suicide notes or talking about guns, knives, or pills. Keep the numbers for these national suicide hotlines: 9-766-007-TALK (3-588.455.7810) and 3-932-OKULLHO (9-746.506.4292). If you or someone you know talks about suicide or feeling hopeless, get help right away. Other instructions  If you breast-feed your baby, you may be more comfortable while you are healing if you place the baby so that he or she is not resting on your belly. Try tucking your baby under your arm, with his or her body along the side you will be feeding on. Support your baby's upper body with your arm. With that hand you can control your baby's head to bring his or her mouth to your breast. This is sometimes called the football hold. Follow-up care is a key part of your treatment and safety. Be sure to make and go to all appointments, and call your doctor if you are having problems.  It's also a good idea to know your test results and keep a list of the medicines you take. When should you call for help? Call 911 anytime you think you may need emergency care. For example, call if:  You are thinking of hurting yourself, your baby, or anyone else. You passed out (lost consciousness). You have symptoms of a blood clot in your lung (called a pulmonary embolism). These may include:  Sudden chest pain. Trouble breathing. Coughing up blood. Call your doctor now or seek immediate medical care if:    You have severe vaginal bleeding. You are soaking through a pad each hour for 2 or more hours. Your vaginal bleeding seems to be getting heavier or is still bright red 4 days after delivery. You are dizzy or lightheaded, or you feel like you may faint. You are vomiting or cannot keep fluids down. You have a fever. You have new or more belly pain. You have loose stitches, or your incision comes open. You have symptoms of infection, such as: Increased pain, swelling, warmth, or redness. Red streaks leading from the incision. Pus draining from the incision. A fever  You pass tissue (not just blood). Your vaginal discharge smells bad. Your belly feels tender or full and hard. Your breasts are continuously painful or red. You feel sad, anxious, or hopeless for more than a few days. You have sudden, severe pain in your belly. You have symptoms of a blood clot in your leg (called a deep vein thrombosis), such as:  Pain in your calf, back of the knee, thigh, or groin. Redness and swelling in your leg or groin. You have symptoms of preeclampsia, such as:  Sudden swelling of your face, hands, or feet. New vision problems (such as dimness or blurring). A severe headache. Your blood pressure is higher than it should be or rises suddenly. You have new nausea or vomiting. Watch closely for changes in your health, and be sure to contact your doctor if you have any problems.      Additional Information: Preventing Infection at Home    We care about preventing infection and avoiding the spread of germs - not only when you are in the hospital but also when you return home. When you return home from the hospital, it's important to take the following steps to help prevent infection and avoid spreading germs that could infect you and others. Ask everyone in your home to follow these guidelines, too. Clean Your Hands  Clean your hands whenever your hands are visibly dirty, before you eat, before or after touching your mouth, nose or eyes, and before preparing food. Clean them after contact with body fluids, using the restroom, touching animals or changing diapers. When washing hands, wet them with warm water and work up a lather. Rub hands for at least 15 seconds, then rinse them and pat them dry with a clean towel or paper towel. When using hand sanitizers, it should take about 15 seconds to rub your hands dry. If not, you probably didn't apply enough . Cover Your Sneeze or Cough  Germs are released into the air whenever you sneeze or cough. To prevent the spread of infection:  Turn away from other people before coughing or sneezing. Cover your mouth or nose with a tissue when you cough or sneeze. Put the tissue in the trash. If you don't have a tissue, cough or sneeze into your upper sleeve, not your hands. Always clean your hands after coughing or sneezing. Care for Wounds  Your skin is your body's first line of defense against germs, but an open wound leaves an easy way for germs to enter your body. To prevent infection:  Clean your hands before and after changing wound dressings, and wear gloves to change dressings if recommended by your doctor. Take special care with IV lines or other devices inserted into the body. If you must touch them, clean your hands first.  Follow any specific instructions from your doctor to care for your wounds.  Contact your doctor if you experience any signs of infection, such as fever or increased redness at the surgical or wound site. Keep a Clean Home  Clean or wipe commonly touched hard surfaces like door handles, sinks, tabletops, phones and TV remotes. Use products labeled \"disinfectant\" to kill harmful bacteria and viruses. Use a clean cloth or paper towel to clean and dry surfaces. Wiping surfaces with a dirty dishcloth, sponge or towel will only spread germs. Never share toothbrushes, epperson, drinking glasses, utensils, razor blades, face cloths or bath towels to avoid spreading germs. Be sure that the linens that you sleep on are clean. Keep pets away from wounds and wash your hands after touching pets, their toys or bedding. We care about you and your health. Remember, preventing infections is a   team effort between you, your family, friends and health care providers. These are general instructions for a healthy lifestyle:    No smoking/ No tobacco products/ Avoid exposure to second hand smoke    Surgeon General's Warning:  Quitting smoking now greatly reduces serious risk to your health. Obesity, smoking, and sedentary lifestyle greatly increases your risk for illness    A healthy diet, regular physical exercise & weight monitoring are important for maintaining a healthy lifestyle    Recognize signs and symptoms of STROKE:    F-face looks uneven    A-arms unable to move or move unevenly    S-speech slurred or non-existent    T-time-call 911 as soon as signs and symptoms begin - DO NOT go       back to bed or wait to see if you get better - TIME IS BRAIN. I have had the opportunity to make my options or choices for discharge. I have received and understand these instructions.

## 2022-09-15 NOTE — ROUTINE PROCESS
Bedside and Verbal shift change report given to Ayah De León (oncoming nurse) by Navarro Cruz (offgoing nurse). Report included the following information SBAR.

## 2022-09-15 NOTE — ROUTINE PROCESS
Bedside shift change report given to David Gtz RN (oncoming nurse) by Mars Cruz RN (offgoing nurse). Report included the following information SBAR. Discharge instructions provided to patient. Verbalized understanding.

## 2022-09-15 NOTE — PROGRESS NOTES
0800- Preceptor review of BALWINDER Meraz RN shift time [id number]. The documentation on patient care has been approved and reviewed. All medications have been administered under the direct supervision of the preceptor.

## 2023-07-13 LAB
C. TRACHOMATIS, EXTERNAL RESULT: NEGATIVE
HEP B, EXTERNAL RESULT: NEGATIVE
HEPATITIS C ANTIBODY, EXTERNAL RESULT: NEGATIVE
HIV, EXTERNAL RESULT: NORMAL
N. GONORRHOEAE, EXTERNAL RESULT: NEGATIVE
RUBELLA TITER, EXTERNAL RESULT: NORMAL
T. PALLIDUM (SYPHILIS) ANTIBODY, EXTERNAL RESULT: NORMAL

## 2024-01-18 ENCOUNTER — HOSPITAL ENCOUNTER (OUTPATIENT)
Facility: HOSPITAL | Age: 34
Discharge: HOME | End: 2024-01-18
Attending: OBSTETRICS & GYNECOLOGY | Admitting: OBSTETRICS & GYNECOLOGY
Payer: COMMERCIAL

## 2024-01-18 ENCOUNTER — ANESTHESIA EVENT (OUTPATIENT)
Facility: HOSPITAL | Age: 34
End: 2024-01-18
Payer: COMMERCIAL

## 2024-01-18 VITALS
SYSTOLIC BLOOD PRESSURE: 126 MMHG | OXYGEN SATURATION: 99 % | DIASTOLIC BLOOD PRESSURE: 81 MMHG | TEMPERATURE: 98 F | RESPIRATION RATE: 16 BRPM | HEART RATE: 92 BPM

## 2024-01-18 LAB
ABO + RH BLD: NORMAL
BASOPHILS # BLD: 0 K/UL (ref 0–0.1)
BASOPHILS NFR BLD: 0 % (ref 0–1)
BLOOD GROUP ANTIBODIES SERPL: NORMAL
DIFFERENTIAL METHOD BLD: ABNORMAL
EOSINOPHIL # BLD: 0 K/UL (ref 0–0.4)
EOSINOPHIL NFR BLD: 0 % (ref 0–7)
ERYTHROCYTE [DISTWIDTH] IN BLOOD BY AUTOMATED COUNT: 13.6 % (ref 11.5–14.5)
HCT VFR BLD AUTO: 33.9 % (ref 35–47)
HGB BLD-MCNC: 11.3 G/DL (ref 11.5–16)
IMM GRANULOCYTES # BLD AUTO: 0.1 K/UL (ref 0–0.04)
IMM GRANULOCYTES NFR BLD AUTO: 1 % (ref 0–0.5)
LYMPHOCYTES # BLD: 1.8 K/UL (ref 0.8–3.5)
LYMPHOCYTES NFR BLD: 17 % (ref 12–49)
MCH RBC QN AUTO: 29.2 PG (ref 26–34)
MCHC RBC AUTO-ENTMCNC: 33.3 G/DL (ref 30–36.5)
MCV RBC AUTO: 87.6 FL (ref 80–99)
MONOCYTES # BLD: 0.7 K/UL (ref 0–1)
MONOCYTES NFR BLD: 7 % (ref 5–13)
NEUTS SEG # BLD: 7.5 K/UL (ref 1.8–8)
NEUTS SEG NFR BLD: 75 % (ref 32–75)
NRBC # BLD: 0 K/UL (ref 0–0.01)
NRBC BLD-RTO: 0 PER 100 WBC
PLATELET # BLD AUTO: 311 K/UL (ref 150–400)
PMV BLD AUTO: 10.4 FL (ref 8.9–12.9)
RBC # BLD AUTO: 3.87 M/UL (ref 3.8–5.2)
SPECIMEN EXP DATE BLD: NORMAL
WBC # BLD AUTO: 10.1 K/UL (ref 3.6–11)

## 2024-01-18 PROCEDURE — 86900 BLOOD TYPING SEROLOGIC ABO: CPT

## 2024-01-18 PROCEDURE — 36415 COLL VENOUS BLD VENIPUNCTURE: CPT

## 2024-01-18 PROCEDURE — 86850 RBC ANTIBODY SCREEN: CPT

## 2024-01-18 PROCEDURE — 86901 BLOOD TYPING SEROLOGIC RH(D): CPT

## 2024-01-18 PROCEDURE — 85025 COMPLETE CBC W/AUTO DIFF WBC: CPT

## 2024-01-18 NOTE — PROGRESS NOTES
Pt's scheduled PAT completed, FHR audbile in the 140's with doppler, labs sent, vital signs WNL. PAT booklet given to pt and pt teaches back ERAS protocol. All questions answered at this time. Pt to return on 24 @ 0600 for scheduled  section.

## 2024-01-19 ENCOUNTER — ANESTHESIA (OUTPATIENT)
Facility: HOSPITAL | Age: 34
End: 2024-01-19
Payer: COMMERCIAL

## 2024-01-19 ENCOUNTER — HOSPITAL ENCOUNTER (INPATIENT)
Facility: HOSPITAL | Age: 34
LOS: 2 days | Discharge: HOME | End: 2024-01-21
Attending: OBSTETRICS & GYNECOLOGY | Admitting: OBSTETRICS & GYNECOLOGY
Payer: COMMERCIAL

## 2024-01-19 PROCEDURE — 7100000001 HC PACU RECOVERY - ADDTL 15 MIN: Performed by: OBSTETRICS & GYNECOLOGY

## 2024-01-19 PROCEDURE — 2580000003 HC RX 258: Performed by: ANESTHESIOLOGY

## 2024-01-19 PROCEDURE — 2580000003 HC RX 258: Performed by: OBSTETRICS & GYNECOLOGY

## 2024-01-19 PROCEDURE — 3700000000 HC ANESTHESIA ATTENDED CARE: Performed by: OBSTETRICS & GYNECOLOGY

## 2024-01-19 PROCEDURE — 3700000001 HC ADD 15 MINUTES (ANESTHESIA): Performed by: OBSTETRICS & GYNECOLOGY

## 2024-01-19 PROCEDURE — A4216 STERILE WATER/SALINE, 10 ML: HCPCS | Performed by: OBSTETRICS & GYNECOLOGY

## 2024-01-19 PROCEDURE — 3609079900 HC CESAREAN SECTION: Performed by: OBSTETRICS & GYNECOLOGY

## 2024-01-19 PROCEDURE — 6360000002 HC RX W HCPCS: Performed by: ANESTHESIOLOGY

## 2024-01-19 PROCEDURE — 1120000000 HC RM PRIVATE OB

## 2024-01-19 PROCEDURE — 7100000000 HC PACU RECOVERY - FIRST 15 MIN: Performed by: OBSTETRICS & GYNECOLOGY

## 2024-01-19 PROCEDURE — 6370000000 HC RX 637 (ALT 250 FOR IP): Performed by: OBSTETRICS & GYNECOLOGY

## 2024-01-19 PROCEDURE — 6360000002 HC RX W HCPCS: Performed by: OBSTETRICS & GYNECOLOGY

## 2024-01-19 PROCEDURE — 2709999900 HC NON-CHARGEABLE SUPPLY: Performed by: OBSTETRICS & GYNECOLOGY

## 2024-01-19 PROCEDURE — 6360000002 HC RX W HCPCS: Performed by: NURSE ANESTHETIST, CERTIFIED REGISTERED

## 2024-01-19 PROCEDURE — 2500000003 HC RX 250 WO HCPCS: Performed by: OBSTETRICS & GYNECOLOGY

## 2024-01-19 RX ORDER — KETOROLAC TROMETHAMINE 30 MG/ML
INJECTION, SOLUTION INTRAMUSCULAR; INTRAVENOUS PRN
Status: DISCONTINUED | OUTPATIENT
Start: 2024-01-19 | End: 2024-01-19 | Stop reason: SDUPTHER

## 2024-01-19 RX ORDER — ACETAMINOPHEN 500 MG
1000 TABLET ORAL EVERY 6 HOURS
Status: DISCONTINUED | OUTPATIENT
Start: 2024-01-20 | End: 2024-01-19

## 2024-01-19 RX ORDER — NALOXONE HYDROCHLORIDE 0.4 MG/ML
INJECTION, SOLUTION INTRAMUSCULAR; INTRAVENOUS; SUBCUTANEOUS PRN
Status: ACTIVE | OUTPATIENT
Start: 2024-01-19 | End: 2024-01-20

## 2024-01-19 RX ORDER — ACETAMINOPHEN 500 MG
1000 TABLET ORAL EVERY 6 HOURS
Status: DISCONTINUED | OUTPATIENT
Start: 2024-01-19 | End: 2024-01-21 | Stop reason: HOSPADM

## 2024-01-19 RX ORDER — DIPHENHYDRAMINE HYDROCHLORIDE 50 MG/ML
12.5 INJECTION INTRAMUSCULAR; INTRAVENOUS EVERY 4 HOURS PRN
Status: DISCONTINUED | OUTPATIENT
Start: 2024-01-19 | End: 2024-01-21 | Stop reason: HOSPADM

## 2024-01-19 RX ORDER — ACETAMINOPHEN 325 MG/1
975 TABLET ORAL ONCE
Status: COMPLETED | OUTPATIENT
Start: 2024-01-19 | End: 2024-01-19

## 2024-01-19 RX ORDER — MODIFIED LANOLIN
OINTMENT (GRAM) TOPICAL
Status: DISCONTINUED | OUTPATIENT
Start: 2024-01-19 | End: 2024-01-21 | Stop reason: HOSPADM

## 2024-01-19 RX ORDER — IBUPROFEN 400 MG/1
800 TABLET ORAL EVERY 8 HOURS SCHEDULED
Status: DISCONTINUED | OUTPATIENT
Start: 2024-01-19 | End: 2024-01-21 | Stop reason: HOSPADM

## 2024-01-19 RX ORDER — DOCUSATE SODIUM 100 MG/1
100 CAPSULE, LIQUID FILLED ORAL 2 TIMES DAILY PRN
Status: DISCONTINUED | OUTPATIENT
Start: 2024-01-19 | End: 2024-01-21 | Stop reason: HOSPADM

## 2024-01-19 RX ORDER — BUPIVACAINE HYDROCHLORIDE 7.5 MG/ML
INJECTION, SOLUTION EPIDURAL; RETROBULBAR
Status: COMPLETED | OUTPATIENT
Start: 2024-01-19 | End: 2024-01-19

## 2024-01-19 RX ORDER — OXYCODONE HYDROCHLORIDE 5 MG/1
5 TABLET ORAL EVERY 4 HOURS PRN
Status: DISCONTINUED | OUTPATIENT
Start: 2024-01-19 | End: 2024-01-19 | Stop reason: SDUPTHER

## 2024-01-19 RX ORDER — HYDROMORPHONE HYDROCHLORIDE 1 MG/ML
0.25 INJECTION, SOLUTION INTRAMUSCULAR; INTRAVENOUS; SUBCUTANEOUS EVERY 6 HOURS PRN
Status: ACTIVE | OUTPATIENT
Start: 2024-01-19 | End: 2024-01-20

## 2024-01-19 RX ORDER — MISOPROSTOL 200 UG/1
800 TABLET ORAL PRN
Status: DISCONTINUED | OUTPATIENT
Start: 2024-01-19 | End: 2024-01-21 | Stop reason: HOSPADM

## 2024-01-19 RX ORDER — ACETAMINOPHEN 325 MG/1
650 TABLET ORAL EVERY 6 HOURS
Status: DISCONTINUED | OUTPATIENT
Start: 2024-01-19 | End: 2024-01-19 | Stop reason: SDUPTHER

## 2024-01-19 RX ORDER — OXYCODONE HYDROCHLORIDE 5 MG/1
5 TABLET ORAL EVERY 4 HOURS PRN
Status: DISCONTINUED | OUTPATIENT
Start: 2024-01-20 | End: 2024-01-21 | Stop reason: HOSPADM

## 2024-01-19 RX ORDER — MORPHINE SULFATE 1 MG/ML
INJECTION, SOLUTION EPIDURAL; INTRATHECAL; INTRAVENOUS
Status: COMPLETED | OUTPATIENT
Start: 2024-01-19 | End: 2024-01-19

## 2024-01-19 RX ORDER — SODIUM CHLORIDE 0.9 % (FLUSH) 0.9 %
5-40 SYRINGE (ML) INJECTION EVERY 12 HOURS SCHEDULED
Status: DISCONTINUED | OUTPATIENT
Start: 2024-01-19 | End: 2024-01-21 | Stop reason: HOSPADM

## 2024-01-19 RX ORDER — ONDANSETRON 2 MG/ML
INJECTION INTRAMUSCULAR; INTRAVENOUS PRN
Status: DISCONTINUED | OUTPATIENT
Start: 2024-01-19 | End: 2024-01-19 | Stop reason: SDUPTHER

## 2024-01-19 RX ORDER — SODIUM CHLORIDE, SODIUM LACTATE, POTASSIUM CHLORIDE, CALCIUM CHLORIDE 600; 310; 30; 20 MG/100ML; MG/100ML; MG/100ML; MG/100ML
INJECTION, SOLUTION INTRAVENOUS CONTINUOUS
Status: DISPENSED | OUTPATIENT
Start: 2024-01-19 | End: 2024-01-19

## 2024-01-19 RX ORDER — SODIUM CHLORIDE, SODIUM LACTATE, POTASSIUM CHLORIDE, AND CALCIUM CHLORIDE .6; .31; .03; .02 G/100ML; G/100ML; G/100ML; G/100ML
1000 INJECTION, SOLUTION INTRAVENOUS ONCE
Status: COMPLETED | OUTPATIENT
Start: 2024-01-19 | End: 2024-01-19

## 2024-01-19 RX ORDER — KETOROLAC TROMETHAMINE 30 MG/ML
30 INJECTION, SOLUTION INTRAMUSCULAR; INTRAVENOUS EVERY 6 HOURS
Status: DISPENSED | OUTPATIENT
Start: 2024-01-19 | End: 2024-01-20

## 2024-01-19 RX ORDER — SIMETHICONE 80 MG
80 TABLET,CHEWABLE ORAL EVERY 6 HOURS PRN
Status: DISCONTINUED | OUTPATIENT
Start: 2024-01-19 | End: 2024-01-21 | Stop reason: HOSPADM

## 2024-01-19 RX ORDER — ONDANSETRON 2 MG/ML
4 INJECTION INTRAMUSCULAR; INTRAVENOUS EVERY 6 HOURS PRN
Status: DISCONTINUED | OUTPATIENT
Start: 2024-01-19 | End: 2024-01-19 | Stop reason: SDUPTHER

## 2024-01-19 RX ORDER — OXYCODONE HYDROCHLORIDE 5 MG/1
10 TABLET ORAL EVERY 4 HOURS PRN
Status: DISCONTINUED | OUTPATIENT
Start: 2024-01-20 | End: 2024-01-21 | Stop reason: HOSPADM

## 2024-01-19 RX ORDER — DEXAMETHASONE SODIUM PHOSPHATE 4 MG/ML
INJECTION, SOLUTION INTRA-ARTICULAR; INTRALESIONAL; INTRAMUSCULAR; INTRAVENOUS; SOFT TISSUE PRN
Status: DISCONTINUED | OUTPATIENT
Start: 2024-01-19 | End: 2024-01-19 | Stop reason: SDUPTHER

## 2024-01-19 RX ORDER — ONDANSETRON 2 MG/ML
4 INJECTION INTRAMUSCULAR; INTRAVENOUS EVERY 6 HOURS PRN
Status: DISCONTINUED | OUTPATIENT
Start: 2024-01-19 | End: 2024-01-21 | Stop reason: HOSPADM

## 2024-01-19 RX ORDER — POLYETHYLENE GLYCOL 3350 17 G/17G
17 POWDER, FOR SOLUTION ORAL DAILY PRN
Status: DISCONTINUED | OUTPATIENT
Start: 2024-01-19 | End: 2024-01-21 | Stop reason: HOSPADM

## 2024-01-19 RX ORDER — SODIUM CHLORIDE 0.9 % (FLUSH) 0.9 %
5-40 SYRINGE (ML) INJECTION PRN
Status: DISCONTINUED | OUTPATIENT
Start: 2024-01-19 | End: 2024-01-21 | Stop reason: HOSPADM

## 2024-01-19 RX ORDER — SODIUM CHLORIDE 0.9 % (FLUSH) 0.9 %
10 SYRINGE (ML) INJECTION PRN
Status: DISCONTINUED | OUTPATIENT
Start: 2024-01-19 | End: 2024-01-21 | Stop reason: HOSPADM

## 2024-01-19 RX ORDER — FENTANYL CITRATE 50 UG/ML
INJECTION, SOLUTION INTRAMUSCULAR; INTRAVENOUS
Status: COMPLETED | OUTPATIENT
Start: 2024-01-19 | End: 2024-01-19

## 2024-01-19 RX ORDER — SODIUM CHLORIDE 9 MG/ML
INJECTION, SOLUTION INTRAVENOUS PRN
Status: DISCONTINUED | OUTPATIENT
Start: 2024-01-19 | End: 2024-01-21 | Stop reason: HOSPADM

## 2024-01-19 RX ORDER — ONDANSETRON 4 MG/1
4 TABLET, ORALLY DISINTEGRATING ORAL EVERY 8 HOURS PRN
Status: DISCONTINUED | OUTPATIENT
Start: 2024-01-19 | End: 2024-01-21 | Stop reason: HOSPADM

## 2024-01-19 RX ORDER — OXYTOCIN/RINGER'S LACTATE 30/500 ML
PLASTIC BAG, INJECTION (ML) INTRAVENOUS CONTINUOUS PRN
Status: DISCONTINUED | OUTPATIENT
Start: 2024-01-19 | End: 2024-01-19 | Stop reason: SDUPTHER

## 2024-01-19 RX ORDER — SODIUM CHLORIDE, SODIUM LACTATE, POTASSIUM CHLORIDE, CALCIUM CHLORIDE 600; 310; 30; 20 MG/100ML; MG/100ML; MG/100ML; MG/100ML
INJECTION, SOLUTION INTRAVENOUS CONTINUOUS
Status: DISCONTINUED | OUTPATIENT
Start: 2024-01-19 | End: 2024-01-21 | Stop reason: HOSPADM

## 2024-01-19 RX ADMIN — PHENYLEPHRINE HYDROCHLORIDE 40 MCG/MIN: 10 INJECTION INTRAVENOUS at 08:09

## 2024-01-19 RX ADMIN — ONDANSETRON HYDROCHLORIDE 4 MG: 2 INJECTION, SOLUTION INTRAMUSCULAR; INTRAVENOUS at 08:58

## 2024-01-19 RX ADMIN — ACETAMINOPHEN 975 MG: 325 TABLET ORAL at 07:31

## 2024-01-19 RX ADMIN — BUPIVACAINE HYDROCHLORIDE 12 MG: 7.5 INJECTION, SOLUTION EPIDURAL; RETROBULBAR at 08:09

## 2024-01-19 RX ADMIN — KETOROLAC TROMETHAMINE 30 MG: 30 INJECTION INTRAMUSCULAR; INTRAVENOUS at 21:31

## 2024-01-19 RX ADMIN — KETOROLAC TROMETHAMINE 30 MG: 30 INJECTION INTRAMUSCULAR; INTRAVENOUS at 15:38

## 2024-01-19 RX ADMIN — KETOROLAC TROMETHAMINE 30 MG: 30 INJECTION, SOLUTION INTRAMUSCULAR; INTRAVENOUS at 09:19

## 2024-01-19 RX ADMIN — SODIUM CHLORIDE, POTASSIUM CHLORIDE, SODIUM LACTATE AND CALCIUM CHLORIDE: 600; 310; 30; 20 INJECTION, SOLUTION INTRAVENOUS at 12:21

## 2024-01-19 RX ADMIN — FAMOTIDINE 20 MG: 10 INJECTION, SOLUTION INTRAVENOUS at 07:34

## 2024-01-19 RX ADMIN — SODIUM CHLORIDE, POTASSIUM CHLORIDE, SODIUM LACTATE AND CALCIUM CHLORIDE 1000 ML: 600; 310; 30; 20 INJECTION, SOLUTION INTRAVENOUS at 08:00

## 2024-01-19 RX ADMIN — Medication 909 MILLI-UNITS/MIN: at 08:39

## 2024-01-19 RX ADMIN — ACETAMINOPHEN 1000 MG: 500 TABLET ORAL at 15:38

## 2024-01-19 RX ADMIN — MORPHINE SULFATE 0.15 MG: 1 INJECTION EPIDURAL; INTRATHECAL; INTRAVENOUS at 08:09

## 2024-01-19 RX ADMIN — DEXAMETHASONE SODIUM PHOSPHATE 4 MG: 4 INJECTION, SOLUTION INTRAMUSCULAR; INTRAVENOUS at 08:58

## 2024-01-19 RX ADMIN — WATER 2000 MG: 1 INJECTION INTRAMUSCULAR; INTRAVENOUS; SUBCUTANEOUS at 08:00

## 2024-01-19 RX ADMIN — SODIUM CHLORIDE, POTASSIUM CHLORIDE, SODIUM LACTATE AND CALCIUM CHLORIDE: 600; 310; 30; 20 INJECTION, SOLUTION INTRAVENOUS at 06:25

## 2024-01-19 RX ADMIN — ONDANSETRON HYDROCHLORIDE 4 MG: 2 INJECTION, SOLUTION INTRAMUSCULAR; INTRAVENOUS at 08:09

## 2024-01-19 RX ADMIN — FENTANYL CITRATE 15 MCG: 50 INJECTION, SOLUTION INTRAMUSCULAR; INTRAVENOUS at 08:09

## 2024-01-19 ASSESSMENT — PAIN DESCRIPTION - DESCRIPTORS
DESCRIPTORS: SORE
DESCRIPTORS: SORE

## 2024-01-19 ASSESSMENT — PAIN SCALES - GENERAL
PAINLEVEL_OUTOF10: 5
PAINLEVEL_OUTOF10: 0
PAINLEVEL_OUTOF10: 2

## 2024-01-19 ASSESSMENT — PAIN - FUNCTIONAL ASSESSMENT
PAIN_FUNCTIONAL_ASSESSMENT: ACTIVITIES ARE NOT PREVENTED
PAIN_FUNCTIONAL_ASSESSMENT: ACTIVITIES ARE NOT PREVENTED

## 2024-01-19 ASSESSMENT — PAIN DESCRIPTION - ORIENTATION
ORIENTATION: LOWER
ORIENTATION: LOWER

## 2024-01-19 ASSESSMENT — PAIN DESCRIPTION - LOCATION
LOCATION: ABDOMEN;INCISION
LOCATION: ABDOMEN

## 2024-01-19 NOTE — OP NOTE
Section Operative Report       Patient: Arabella Lundy MRN: [id number]  SSN: [id number]    YOB: 1990  Age: 33 y.o.  Sex: female       Date of Procedure: 2024    Preoperative Diagnosis: Delivery by  section using transverse incision of lower segment of uterus [O82]    Postoperative Diagnosis: same but delivered    Procedure: Low Transverse Procedure(s):   SECTION ( E R A S )    Surgeon(s):  Abigail Robins DO  Assistant: Darla Seaver, RN    Anesthesia: Spinal    Estimated Blood Loss : 600cc    Findings:   Information for the patient's :  Nimesh Lundy [[id number]]   @[phone]@      Specimens: placenta- discarded           Complications:  none    Procedure Details:    After informed consent was obtained, the patient was taken to the operating room, where Spinal anesthesia was administered and found to be adequate. Arriaga catheter had been placed using sterile technique. The patient was prepped and draped in the usual sterile fashion.  After testing for adequate anesthesia, a Pfannenstiel incision was made and carried to the anterior rectus fascia with the bovie which was nicked in the midline with a scalpel.  This incision in the fascia was extended laterally with curved Garcia Scissors.  The rectus muscles were  from the fascial sheath in a blunt fashion and with garcia scissors.  The muscles were then parted in the midline, the peritoneum was entered bluntly and stretched.   The bladder blade was then inserted. The vesicouterine peritoneum was identified and entered sharply with Metzenbaum scissors. The bladder flap was then created sharply and the bladder blade was reinserted. A low transverse uterine incision was made with the scalpel and extended laterally with blunt finger dissection. Amniotomy was performed. The baby’s head was then delivered atraumatically followed by the remainder of the body. The nose and mouth were suctioned. The cord was

## 2024-01-19 NOTE — ANESTHESIA POSTPROCEDURE EVALUATION
Post-Anesthesia Evaluation and Assessment    Patient: Arabella Lundy MRN: [id number]  SSN: [id number]    YOB: 1990  Age: 33 y.o.  Sex: female      I have evaluated the patient and they are stable and ready for discharge from the PACU.     Cardiovascular Function/Vital Signs  Visit Vitals  /68   Pulse 61   Temp 97.6 °F (36.4 °C) (Oral)   Resp 16   Ht 1.803 m (5' 11\")   Wt 86.6 kg (191 lb)   SpO2 99%   Breastfeeding Unknown   BMI 26.64 kg/m²       Patient is status post Spinal anesthesia for Procedure(s):   SECTION ( E R A S ).    Nausea/Vomiting: None    Postoperative hydration reviewed and adequate.    Pain:  Managed    Neurological Status:   At baseline    Mental Status, Level of Consciousness: Alert and  oriented to person, place, and time    Pulmonary Status:   Adequate oxygenation and airway patent    Complications related to anesthesia: None    Post-anesthesia assessment completed. No concerns    Signed By: Chris Aocsta MD     2024

## 2024-01-19 NOTE — PROGRESS NOTES
0600-Received client to LDR#11 for a scheduled repeat . Client denies any complications with pregnancy. Labs drawn during PAT appointment yesterday.Client also denies any contractions or leakage of fluid at this time.   0618-IV started without difficulty.   0625-Sukumar hose applied and abdominal chlorhexidene wipes prep completed.

## 2024-01-19 NOTE — H&P
History & Physical    Name: Arabella Lundy MRN: [id number]  SSN: [id number]    YOB: 1990  Age: 33 y.o.  Sex: female      Subjective: scheduled C/S     Estimated Date of Delivery: 24  OB History          2    Para        Term           1    AB        Living   1         SAB        IAB        Ectopic        Molar        Multiple        Live Births                    Ms. Lundy admitted with pregnancy at 39w1d for  section due to previous  section and short interpregnancy interval. Prenatal course was normal. Please see prenatal records for details.    Past Medical History:   Diagnosis Date    Abnormal Papanicolaou smear of cervix     HPV hx of colposcopy in [facility]    Psychiatric problem     anxiety disorder     Past Surgical History:   Procedure Laterality Date    RHINOPLASTY  2009    WISDOM TOOTH EXTRACTION Bilateral      Social History     Occupational History    Not on file   Tobacco Use    Smoking status: Never    Smokeless tobacco: Never   Substance and Sexual Activity    Alcohol use: Not Currently    Drug use: Never    Sexual activity: Not on file     No family history on file.    Allergies   Allergen Reactions    Sulfa Antibiotics Rash     Prior to Admission medications    Medication Sig Start Date End Date Taking? Authorizing Provider   Prenatal Vit-Fe Fumarate-FA (PRENATAL PO) Take 1 tablet by mouth Daily   Yes Provider, MD Denis   ibuprofen (ADVIL;MOTRIN) 800 MG tablet Take 800 mg by mouth in the morning and 800 mg at noon and 800 mg in the evening.  Patient not taking: Reported on 2024 9/15/22   Automatic Reconciliation, Ar   sertraline (ZOLOFT) 25 MG tablet Take 2 tablets by mouth daily    Automatic Reconciliation, Ar        Review of Systems: A comprehensive review of systems was negative except for that written in the History of Present Illness.    Objective:     Vitals:  Vitals:    24 0611   BP: 117/85   Pulse: 90   Resp: 16

## 2024-01-19 NOTE — ANESTHESIA PRE PROCEDURE
Department of Anesthesiology  Preprocedure Note       Name:  Arabella Lundy   Age:  33 y.o.  :  1990                                          MRN:  [id number]         Date:  2024      Surgeon: Surgeon(s):  Abigail Robins DO    Procedure: Procedure(s):   SECTION ( E R A S )    Medications prior to admission:   Prior to Admission medications    Medication Sig Start Date End Date Taking? Authorizing Provider   Prenatal Vit-Fe Fumarate-FA (PRENATAL PO) Take 1 tablet by mouth Daily   Yes Provider, MD Denis   ibuprofen (ADVIL;MOTRIN) 800 MG tablet Take 800 mg by mouth in the morning and 800 mg at noon and 800 mg in the evening.  Patient not taking: Reported on 2024 9/15/22   Automatic Reconciliation, Ar   sertraline (ZOLOFT) 25 MG tablet Take 2 tablets by mouth daily    Automatic Reconciliation, Ar       Current medications:    No current facility-administered medications for this encounter.       Allergies:    Allergies   Allergen Reactions    Sulfa Antibiotics Rash       Problem List:    Patient Active Problem List   Diagnosis Code    Placenta previa O44.00    Placenta previa antepartum in third trimester O44.03    Placenta previa antepartum O44.00       Past Medical History:        Diagnosis Date    Abnormal Papanicolaou smear of cervix     HPV hx of colposcopy in [facility]    Psychiatric problem     anxiety disorder       Past Surgical History:        Procedure Laterality Date    RHINOPLASTY  2009    WISDOM TOOTH EXTRACTION Bilateral 2008       Social History:    Social History     Tobacco Use    Smoking status: Never    Smokeless tobacco: Never   Substance Use Topics    Alcohol use: Not Currently                                Counseling given: Not Answered      Vital Signs (Current):   Vitals:    24 0611   BP: 117/85   Pulse: 90   Resp: 16   Temp: 98.3 °F (36.8 °C)   TempSrc: Oral   SpO2: 98%                                              BP Readings from Last 3 Encounters:

## 2024-01-19 NOTE — ANESTHESIA PROCEDURE NOTES
Spinal Block    Patient location during procedure: OR  Reason for block: primary anesthetic  Staffing  Performed: anesthesiologist   Anesthesiologist: Chris Acosta MD  Performed by: Chris Acosta MD  Authorized by: Chris Acosta MD    Spinal Block  Patient position: sitting  Prep: DuraPrep  Patient monitoring: cardiac monitor, continuous pulse ox, frequent blood pressure checks and oxygen  Approach: midline  Location: L4/L5  Provider prep: mask and sterile gloves  Needle  Needle type: pencil-tip   Needle gauge: 25 G  Needle length: 4 in  Assessment  Sensory level: T4  Events: None  Swirl obtained: Yes  CSF: clear  Attempts: 1  Hemodynamics: stable  Preanesthetic Checklist  Completed: patient identified, IV checked, site marked, risks and benefits discussed, surgical/procedural consents, equipment checked, pre-op evaluation, timeout performed, anesthesia consent given, oxygen available, monitors applied/VS acknowledged and fire risk safety assessment completed and verbalized

## 2024-01-19 NOTE — LACTATION NOTE
Infant born vaginally today to a  mom at 39 1/7 weeks gestation. Infant admitted to NICU.  Pt will successfully establish breast milk supply by pumping with a hospital grade pump every 2-3 hours for approximately 20 minutes/8-10 x day with the correct size flange, and suction level for mother's comfort.  To maximize milk production, mom taught to incorporate breast massage and hand expression into pumping sessions.  All expressed breast milk (EBM) will be provided for infant use, in clean bottles/syringes for storage in NICU breastmilk refrigerator. Patient label with barcode,date and time applied to each container prior to transport to NICU. Proper cleaning of pump parts and good hand hygiene discussed. Mother is advised to rent a hospital grade pump to continue regimen at home. Progress of milk transition, pumping log, expected EBM volumes, care of engorged breasts discussed.The value of bonding with baby emphasized, strategies for participating in infant care, photos, footprints, touch, and holding skin to skin as soon as baby and mother are able have been shown to increase oxytocin levels.  The breast will be offered as baby is ready; with the goal of eventual transition to breastfeeding.

## 2024-01-19 NOTE — PROGRESS NOTES
0782 Bedside and verbal SBAR report received from DEEPTHI Adam RN    0802 Pt transferred to OR 1 for R C/S    0929 Out of OR. Pt transferred to room 11    1110 TRANSFER - OUT REPORT:    Verbal report given to Dari Lundy  being transferred to MIU for routine progression of patient care       Report consisted of patient's Situation, Background, Assessment and   Recommendations(SBAR).     Information from the following report(s) Nurse Handoff Report, Adult Overview, Surgery Report, Intake/Output, MAR, and Recent Results was reviewed with the receiving nurse.           Lines:   Peripheral IV 01/19/24 Distal;Left;Posterior Forearm (Active)   Site Assessment Clean;Dry 01/19/24 0751   Line Status Blood return noted;Infusing 01/19/24 0751   Phlebitis Assessment No symptoms 01/19/24 0751   Infiltration Assessment 0 01/19/24 0751   Dressing Status Clean, dry & intact 01/19/24 0751   Dressing Type Transparent 01/19/24 0751        Opportunity for questions and clarification was provided.      Patient transported with:  Registered Nurse

## 2024-01-20 LAB
ERYTHROCYTE [DISTWIDTH] IN BLOOD BY AUTOMATED COUNT: 13.5 % (ref 11.5–14.5)
HCT VFR BLD AUTO: 28.5 % (ref 35–47)
HGB BLD-MCNC: 9.3 G/DL (ref 11.5–16)
MCH RBC QN AUTO: 29.1 PG (ref 26–34)
MCHC RBC AUTO-ENTMCNC: 32.6 G/DL (ref 30–36.5)
MCV RBC AUTO: 89.1 FL (ref 80–99)
NRBC # BLD: 0 K/UL (ref 0–0.01)
NRBC BLD-RTO: 0 PER 100 WBC
PLATELET # BLD AUTO: 257 K/UL (ref 150–400)
PMV BLD AUTO: 10.4 FL (ref 8.9–12.9)
RBC # BLD AUTO: 3.2 M/UL (ref 3.8–5.2)
WBC # BLD AUTO: 11.1 K/UL (ref 3.6–11)

## 2024-01-20 PROCEDURE — 6370000000 HC RX 637 (ALT 250 FOR IP): Performed by: OBSTETRICS & GYNECOLOGY

## 2024-01-20 PROCEDURE — 6360000002 HC RX W HCPCS: Performed by: ANESTHESIOLOGY

## 2024-01-20 PROCEDURE — 1120000000 HC RM PRIVATE OB

## 2024-01-20 PROCEDURE — 85027 COMPLETE CBC AUTOMATED: CPT

## 2024-01-20 PROCEDURE — 36415 COLL VENOUS BLD VENIPUNCTURE: CPT

## 2024-01-20 RX ADMIN — KETOROLAC TROMETHAMINE 30 MG: 30 INJECTION INTRAMUSCULAR; INTRAVENOUS at 03:29

## 2024-01-20 RX ADMIN — IBUPROFEN 800 MG: 400 TABLET, FILM COATED ORAL at 11:26

## 2024-01-20 RX ADMIN — IBUPROFEN 800 MG: 400 TABLET, FILM COATED ORAL at 20:05

## 2024-01-20 RX ADMIN — DOCUSATE SODIUM 100 MG: 100 CAPSULE, LIQUID FILLED ORAL at 20:05

## 2024-01-20 RX ADMIN — SERTRALINE HYDROCHLORIDE 50 MG: 50 TABLET ORAL at 07:55

## 2024-01-20 RX ADMIN — ACETAMINOPHEN 1000 MG: 500 TABLET ORAL at 18:01

## 2024-01-20 RX ADMIN — ACETAMINOPHEN 1000 MG: 500 TABLET ORAL at 07:43

## 2024-01-20 RX ADMIN — DOCUSATE SODIUM 100 MG: 100 CAPSULE, LIQUID FILLED ORAL at 07:55

## 2024-01-20 ASSESSMENT — PAIN SCALES - GENERAL
PAINLEVEL_OUTOF10: 1
PAINLEVEL_OUTOF10: 5
PAINLEVEL_OUTOF10: 5
PAINLEVEL_OUTOF10: 6
PAINLEVEL_OUTOF10: 4

## 2024-01-20 ASSESSMENT — PAIN - FUNCTIONAL ASSESSMENT
PAIN_FUNCTIONAL_ASSESSMENT: ACTIVITIES ARE NOT PREVENTED

## 2024-01-20 ASSESSMENT — PAIN DESCRIPTION - DESCRIPTORS
DESCRIPTORS: SORE

## 2024-01-20 ASSESSMENT — PAIN DESCRIPTION - LOCATION
LOCATION: ABDOMEN
LOCATION: INCISION
LOCATION: ABDOMEN

## 2024-01-20 ASSESSMENT — PAIN DESCRIPTION - ORIENTATION
ORIENTATION: LOWER
ORIENTATION: LOWER
ORIENTATION: MID
ORIENTATION: LOWER
ORIENTATION: LOWER

## 2024-01-20 NOTE — DISCHARGE INSTRUCTIONS
Discharge Instructions for Vaginal Delivery    Patient ID:  Arabella Lundy  [id number]  33 y.o.  1990    Take Home Medications   See medication list  Continue taking your prenatal vitamins if you are breastfeeding.    Follow-up Appointment:  Follow-up with Dr. Robins. Please call office to schedule appointment     Follow-up care is a key part of your treatment and safety. Be sure to make and go to all appointments, and call your doctor if you are having problems. It’s also a good idea to know your test results and keep a list of the medicines you take.    Activity  Avoid anything in your vagina for 6 weeks (no intercourse, tampons, or douching).  You may drive unless you are taking prescription pain medications.  Climbing stairs and light lifting are ok after a vaginal delivery unless your doctor tells you not to.  You may gradually work up to exercise over the next few weeks, but take it easy- you'll be tired!    Diet  You may eat a regular diet but you may want to avoid heavy, greasy foods and other foods that could increase constipation.    Wound care  If you have stitches, continue to rinse with a squirt bottle of warm water each time you use the bathroom for about 2 weeks.  Your stitches will gradually dissolve over several weeks.  Sitz baths are also helpful to keep the wound clean, encourage healing, and to help with pain associated with the stitches or hemorrhoids.  You can use either a sitz bath basin or a bathtub filled with 2-3\" inches of plain warm water.  Soak for 10 minutes 3 times a day.    Pain Management  If you were not given a prescription pain medication, you can take over the counter pain medicines like Tylenol (acetominophen), Advil or Motrin (ibuprofen).  You can take acetominophen and ibuprofen together, alternating doses every few hours.  You will get the most relief if you take the maximum dose:  Tylenol or acetominophen 1000 mg every 6 hours (equivalent to 2 Extra Strength Tylenols

## 2024-01-20 NOTE — LACTATION NOTE
This note was copied from a baby's chart.  Infant transferred to NBN from NICU this afternoon. Mom states she has been pumping but had not yet put him to the breast. I helped mom with a feeding this after  noon. We reviewed positioning the baby at the breast and how mom can help him get a deep latch. He is sleepy but was able to latch in the prone position. We used some formula at the breast to keep him sucking. Baby will be supplemented with formula and mom will continue pumping. As baby is nursing better they will decrease the amount of pc formula.

## 2024-01-20 NOTE — PROGRESS NOTES
Post-Operative  Day 1    Patient doing well without unusual complaints. Tolerating diet without N&V. Lochia stable. Tolerating diet without N&V.  no flatus/ No BM. Ambulating without difficulty     RN reports gush of blood per vagina this morning with pumping. Not enough to weigh    Vitals:  /78   Pulse 78   Temp 97.7 °F (36.5 °C) (Oral)   Resp 16   Ht 1.803 m (5' 11\")   Wt 86.6 kg (191 lb)   SpO2 100%   Breastfeeding Unknown   BMI 26.64 kg/m²   Temp (24hrs), Av.1 °F (36.7 °C), Min:97.6 °F (36.4 °C), Max:98.6 °F (37 °C)    Exam:       Patient without distress.  Abdomen:soft, expected tenderness, fundus firm, wound dressing removed. Incision C/D/I     Lower extremities are nontender without edema. No cords    Labs:   Lab Results   Component Value Date/Time    WBC 11.1 2024 03:40 AM    WBC 10.1 2024 11:18 AM    WBC 19.7 2022 06:29 AM    WBC 10.1 2022 06:32 AM    WBC 9.6 2022 10:00 AM    HGB 9.3 2024 03:40 AM    HGB 11.3 2024 11:18 AM    HGB 9.6 2022 06:29 AM    HGB 12.0 2022 06:32 AM    HGB 11.7 2022 10:00 AM    HCT 28.5 2024 03:40 AM    HCT 33.9 2024 11:18 AM    HCT 28.7 2022 06:29 AM    HCT 35.1 2022 06:32 AM    HCT 34.5 2022 10:00 AM     2024 03:40 AM     2024 11:18 AM     2022 06:29 AM     2022 06:32 AM     2022 10:00 AM       Recent Results (from the past 24 hour(s))   CBC    Collection Time: 24  3:40 AM   Result Value Ref Range    WBC 11.1 (H) 3.6 - 11.0 K/uL    RBC 3.20 (L) 3.80 - 5.20 M/uL    Hemoglobin 9.3 (L) 11.5 - 16.0 g/dL    Hematocrit 28.5 (L) 35.0 - 47.0 %    MCV 89.1 80.0 - 99.0 FL    MCH 29.1 26.0 - 34.0 PG    MCHC 32.6 30.0 - 36.5 g/dL    RDW 13.5 11.5 - 14.5 %    Platelets 257 150 - 400 K/uL    MPV 10.4 8.9 - 12.9 FL    Nucleated RBCs 0.0 0  WBC    nRBC 0.00 0.00 - 0.01 K/uL           Assessment/Plan:   Ms.

## 2024-01-21 VITALS
BODY MASS INDEX: 26.74 KG/M2 | DIASTOLIC BLOOD PRESSURE: 78 MMHG | SYSTOLIC BLOOD PRESSURE: 119 MMHG | TEMPERATURE: 98.1 F | WEIGHT: 191 LBS | HEART RATE: 78 BPM | RESPIRATION RATE: 16 BRPM | HEIGHT: 71 IN | OXYGEN SATURATION: 97 %

## 2024-01-21 PROCEDURE — 6370000000 HC RX 637 (ALT 250 FOR IP): Performed by: OBSTETRICS & GYNECOLOGY

## 2024-01-21 RX ORDER — ACETAMINOPHEN 500 MG
1000 TABLET ORAL EVERY 6 HOURS
Qty: 120 TABLET | Refills: 3 | Status: SHIPPED | OUTPATIENT
Start: 2024-01-21

## 2024-01-21 RX ORDER — IBUPROFEN 800 MG/1
800 TABLET ORAL EVERY 8 HOURS SCHEDULED
Qty: 120 TABLET | Refills: 3 | Status: SHIPPED | OUTPATIENT
Start: 2024-01-21

## 2024-01-21 RX ADMIN — DOCUSATE SODIUM 100 MG: 100 CAPSULE, LIQUID FILLED ORAL at 11:07

## 2024-01-21 RX ADMIN — ACETAMINOPHEN 1000 MG: 500 TABLET ORAL at 04:07

## 2024-01-21 RX ADMIN — IBUPROFEN 800 MG: 400 TABLET, FILM COATED ORAL at 04:07

## 2024-01-21 RX ADMIN — SERTRALINE HYDROCHLORIDE 50 MG: 50 TABLET ORAL at 11:07

## 2024-01-21 ASSESSMENT — PAIN DESCRIPTION - ORIENTATION: ORIENTATION: LOWER

## 2024-01-21 ASSESSMENT — PAIN DESCRIPTION - LOCATION: LOCATION: ABDOMEN

## 2024-01-21 ASSESSMENT — PAIN - FUNCTIONAL ASSESSMENT: PAIN_FUNCTIONAL_ASSESSMENT: ACTIVITIES ARE NOT PREVENTED

## 2024-01-21 ASSESSMENT — PAIN SCALES - GENERAL: PAINLEVEL_OUTOF10: 5

## 2024-01-21 ASSESSMENT — PAIN DESCRIPTION - DESCRIPTORS: DESCRIPTORS: SORE

## 2024-01-21 NOTE — LACTATION NOTE
This note was copied from a baby's chart.  Mom and baby scheduled for discharge today. Mom states the baby has been latching and nursing well but she has been supplementing with formula. Mom said she feels like her milk is just starting to come in. We talked about her putting the baby to the breast more often and giving less formula. Mom will be following up with her pediatrician tomorrow.     Breast feeding teaching completed and all questions answered.

## 2024-01-21 NOTE — DISCHARGE SUMMARY
Patient ID:  Arabella Lundy  [id number]  33 y.o.  1990    Admit Date: 2024    Discharge Date: 2024     Admitting Physician: Abigail Robins DO  Attending Physician: Abigail Robins DO    Admission Diagnoses:   Hx of Previous  section   Short interval pregnancy     Discharge Diagnoses: Same as above with RLTCS producing a viable male infant.  Information for the patient's :  Nimesh Lundy [[id number]]   Birthweight: 3.9 kg (8 lb 9.6 oz)          Maternal Labs: Rubella immune/ Rh+/ GBS neg    Cord Blood Results:   Information for the patient's :  Nimesh Lundy [[id number]]   No results found for: \"ABORH\", \"PCTDIG\", \"BILI\"         History of Present Illness:   OB History          2    Para   1    Term   1       1    AB        Living   2         SAB        IAB        Ectopic        Molar        Multiple   0    Live Births   1              Admitted for scheduled RLTC S due to hx LTCS in setting of short interval pregnancy     Hospital Course:   Patient was admitted as above and delivered via RLTCS .  Please the chart for details.  The postpartum course was unremarkable.  She was deemed stable for discharge home on day 2.    Follow-up:  She was instructed to follow-up with her obstetrician in 2 and 4 or 6 weeks. Instructed to call office to schedule     Results of Postpartum Depression Screening:  EPDS   pending           Signed:  Nini Heredia DO  2024  10:52 AM

## 2024-01-21 NOTE — PROGRESS NOTES
Post-Operative  Day 2    Patient doing well without unusual complaints. Tolerating diet without N&V. Lochia stable. Tolerating diet without N&V.  no flatus/ No BM. Ambulating without difficulty     Vitals:  /78   Pulse 78   Temp 98.1 °F (36.7 °C) (Oral)   Resp 16   Ht 1.803 m (5' 11\")   Wt 86.6 kg (191 lb)   SpO2 97%   Breastfeeding Unknown   BMI 26.64 kg/m²   Temp (24hrs), Av.9 °F (36.6 °C), Min:97.4 °F (36.3 °C), Max:98.1 °F (36.7 °C)    Exam:       Patient without distress.  Abdomen:soft, expected tenderness, fundus firm, wound dressing removed. Incision C/D/I     Lower extremities are nontender without edema. No cords    Labs:   Lab Results   Component Value Date/Time    WBC 11.1 2024 03:40 AM    WBC 10.1 2024 11:18 AM    WBC 19.7 2022 06:29 AM    WBC 10.1 2022 06:32 AM    WBC 9.6 2022 10:00 AM    HGB 9.3 2024 03:40 AM    HGB 11.3 2024 11:18 AM    HGB 9.6 2022 06:29 AM    HGB 12.0 2022 06:32 AM    HGB 11.7 2022 10:00 AM    HCT 28.5 2024 03:40 AM    HCT 33.9 2024 11:18 AM    HCT 28.7 2022 06:29 AM    HCT 35.1 2022 06:32 AM    HCT 34.5 2022 10:00 AM     2024 03:40 AM     2024 11:18 AM     2022 06:29 AM     2022 06:32 AM     2022 10:00 AM       No results found for this or any previous visit (from the past 24 hour(s)).          Assessment/Plan:   Ms. Arabella Lundy is a 33 y.o.  [facility]  [facility]    POD#2  - continue routine post op care  - Rh +/ Rubella immune/ GBS neg  - VMI, circ completed  - Plan for discharge home today

## 2025-03-24 NOTE — PROGRESS NOTES
Post-Operative  Day 3    Pepe Butt is a 32 y.o.  who is POD 3 s/p emergent primary low transverse  section at 34w0d, secondary to acute hemorrhage in the setting of placenta previa. Patient doing well without unusual complaints this morning. Ambulating and voiding without difficulty. Pain well controlled. Tolerating regular diet, passing flatus. Ready to be discharged home today. Baby boy, in NICU, doing well. She is pumping. Vitals:  Visit Vitals  /74 (BP 1 Location: Left arm, BP Patient Position: At rest)   Pulse 89   Temp 97.5 °F (36.4 °C)   Resp 16   Ht 5' 11\" (1.803 m)   Wt 82.1 kg (181 lb)   SpO2 96%   Breastfeeding Unknown   BMI 25.24 kg/m²     Temp (24hrs), Av.7 °F (36.5 °C), Min:97.5 °F (36.4 °C), Max:97.8 °F (36.6 °C)      Last 24hr Input/Output:  No intake or output data in the 24 hours ending 09/15/22 0835         Exam:       Patient without distress. Abdomen:soft, expected tenderness, fundus firm, dermabond over incision with no areas of drainage, erythema, or warmth     Lower extremities are nontender without edema. No cords    Labs:   Lab Results   Component Value Date/Time    WBC 19.7 (H) 2022 06:29 AM    WBC 10.1 2022 06:32 AM    WBC 9.6 2022 10:00 AM    HGB 9.6 (L) 2022 06:29 AM    HGB 12.0 2022 06:32 AM    HGB 11.7 2022 10:00 AM    HCT 28.7 (L) 2022 06:29 AM    HCT 35.1 2022 06:32 AM    HCT 34.5 (L) 2022 10:00 AM    PLATELET 634  06:29 AM    PLATELET 723  06:32 AM    PLATELET 978  10:00 AM       No results found for this or any previous visit (from the past 24 hour(s)). Assessment: Post-Op day 3, stable  Acute blood loss anemia: post op Hgb 9.6, appropriate decrease from 12.0  Rubella non immune  Baby boy, in NICU    Plan:   1. Routine post-operative care   2. Will need MMR prior to discharge   3.  Discharge home today: care instructions reviewed, will follow up with Dr. Ayesha Agustin in 2 weeks for an incision check      Uriel Tena MD  9/15/2022  8:18 AM [employer]

## (undated) DEVICE — ELECTRODE PT RET AD L9FT HI MOIST COND ADH HYDRGEL CORDED

## (undated) DEVICE — DRESSING SIL W4XL5IN ANTIBACT GELLING FBR CYTOFORM

## (undated) DEVICE — ROYALSILK SURGICAL GOWN, L: Brand: CONVERTORS

## (undated) DEVICE — KIT BLD SAMP ART W/ 1ML LUERSLIP SYR 25GA PRE ATTCAHED NDL

## (undated) DEVICE — CLEANER ES TIP W2XL2IN ADH BK RADPQ FOR S STL ELECTRD

## (undated) DEVICE — KENDALL SCD EXPRESS SLEEVES, KNEE LENGTH, MEDIUM: Brand: KENDALL SCD

## (undated) DEVICE — SPINAL TRAY NDL 24 GAX4 IN SPROTTE

## (undated) DEVICE — SOLUTION IRRIG 1000ML 0.9% SOD CHL USP POUR PLAS BTL

## (undated) DEVICE — SUTURE VCRL SZ 0 L36IN ABSRB VLT L40MM CT 1/2 CIR J358H

## (undated) DEVICE — CATH FOLEY 16F LUBRI-SIL IC --

## (undated) DEVICE — ATTACHMENT SMK 3/8INX10FT VALLEYLAB

## (undated) DEVICE — STERILE POLYISOPRENE POWDER-FREE SURGICAL GLOVES: Brand: PROTEXIS

## (undated) DEVICE — APPLICATOR MEDICATED 26 CC SOLUTION HI LT ORNG CHLORAPREP

## (undated) DEVICE — SOLUTION IRRIG 1000ML STRL H2O USP PLAS POUR BTL

## (undated) DEVICE — STERILE POLYISOPRENE POWDER-FREE SURGICAL GLOVES WITH EMOLLIENT COATING: Brand: PROTEXIS

## (undated) DEVICE — SUTURE MCRYL SZ 4-0 L27IN ABSRB UD L24MM PS-1 3/8 CIR PRIM Y935H

## (undated) DEVICE — SOLUTION IRRIG 1000ML H2O STRL BLT

## (undated) DEVICE — SUTURE MCRYL SZ 0 L36IN ABSRB UD L36MM CT-1 1/2 CIR Y946H

## (undated) DEVICE — DEVON™ KNEE AND BODY STRAP 60" X 3" (1.5 M X 7.6 CM): Brand: DEVON

## (undated) DEVICE — POWDER HEMOSTAT GEL 3.0GR -- SURGICEL

## (undated) DEVICE — SOLIDIFIER FLUID 3000 CC ABSORB

## (undated) DEVICE — ELECTROSURGICAL DEVICE HOLSTER;FOR USE WITH MAXIMUM PEAK VOLTAGE OF 4000 V: Brand: FORCE TRIVERSE

## (undated) DEVICE — MEDI-VAC NON-CONDUCTIVE SUCTION TUBING: Brand: CARDINAL HEALTH

## (undated) DEVICE — LARGE, DISPOSABLE ALEXIS O C-SECTION PROTECTOR - RETRACTOR: Brand: ALEXIS ® O C-SECTION PROTECTOR - RETRACTOR

## (undated) DEVICE — SOLUTION IV 1000ML 0.9% SOD CHL

## (undated) DEVICE — SPONGE LAP 18X18IN STRL -- 5/PK

## (undated) DEVICE — PACK PROCEDURE SURG C SECT KT SMH

## (undated) DEVICE — 3000CC GUARDIAN II: Brand: GUARDIAN

## (undated) DEVICE — Z INACTIVE NO ACTIVE SUPPLIER APPLICATOR MEDICATED 26 CC TINT HI-LITE ORNG STRL CHLORAPREP

## (undated) DEVICE — DRAPE FLD WRM W44XL66IN C6L FOR INTRATEMP SYS THERMABASIN

## (undated) DEVICE — COVERALL PREM SMS 2XL KNIT --

## (undated) DEVICE — SUTURE VCRL SZ 2-0 L36IN ABSRB VLT L36MM CT-1 1/2 CIR J345H

## (undated) DEVICE — REM POLYHESIVE ADULT PATIENT RETURN ELECTRODE: Brand: VALLEYLAB

## (undated) DEVICE — LIGHT HANDLE: Brand: DEVON

## (undated) DEVICE — SUTURE MCRYL SZ 3-0 L27IN ABSRB UD L60MM KS STR REV CUT Y523H

## (undated) DEVICE — COVERALLS PROTCT 2XL WHT SMS ANTISTATIC PREM KNIT CUF FULL

## (undated) DEVICE — (D)PREP SKN CHLRAPRP APPL 26ML -- CONVERT TO ITEM 371833

## (undated) DEVICE — POOLE SUCTION INSTRUMENT WITH REMOVABLE SHEATH: Brand: POOLE

## (undated) DEVICE — ROYAL SILK SURGICAL GOWN, XXL: Brand: CONVERTORS